# Patient Record
Sex: FEMALE | Race: BLACK OR AFRICAN AMERICAN | Employment: UNEMPLOYED | URBAN - METROPOLITAN AREA
[De-identification: names, ages, dates, MRNs, and addresses within clinical notes are randomized per-mention and may not be internally consistent; named-entity substitution may affect disease eponyms.]

---

## 2017-04-26 ENCOUNTER — TELEPHONE (OUTPATIENT)
Dept: GASTROENTEROLOGY | Age: 37
End: 2017-04-26

## 2017-05-01 ENCOUNTER — OFFICE VISIT (OUTPATIENT)
Dept: GASTROENTEROLOGY | Age: 37
End: 2017-05-01

## 2017-05-01 ENCOUNTER — TELEPHONE (OUTPATIENT)
Dept: GASTROENTEROLOGY | Age: 37
End: 2017-05-01

## 2017-05-01 VITALS
WEIGHT: 200 LBS | SYSTOLIC BLOOD PRESSURE: 114 MMHG | OXYGEN SATURATION: 98 % | DIASTOLIC BLOOD PRESSURE: 72 MMHG | HEIGHT: 62 IN | BODY MASS INDEX: 36.8 KG/M2 | HEART RATE: 94 BPM

## 2017-05-01 DIAGNOSIS — K62.89 ANAL PAIN: Primary | ICD-10-CM

## 2017-05-01 DIAGNOSIS — K59.01 SLOW TRANSIT CONSTIPATION: ICD-10-CM

## 2017-05-01 DIAGNOSIS — R10.32 LEFT LOWER QUADRANT PAIN: ICD-10-CM

## 2017-05-01 PROCEDURE — 99214 OFFICE O/P EST MOD 30 MIN: CPT | Performed by: NURSE PRACTITIONER

## 2017-05-01 RX ORDER — CLONAZEPAM 0.5 MG/1
TABLET ORAL
Refills: 2 | COMMUNITY
Start: 2017-02-22 | End: 2019-01-20

## 2017-05-01 RX ORDER — WHEAT DEXTRIN 3 G/3.8 G
4 POWDER (GRAM) ORAL
Qty: 1 CAN | Refills: 0 | Status: SHIPPED | OUTPATIENT
Start: 2017-05-01 | End: 2020-12-22

## 2017-05-01 ASSESSMENT — ENCOUNTER SYMPTOMS
BLURRED VISION: 0
ABDOMINAL PAIN: 1
BLOOD IN STOOL: 0
PHOTOPHOBIA: 0
ORTHOPNEA: 0
EYE PAIN: 0
NAUSEA: 0
SPUTUM PRODUCTION: 0
SHORTNESS OF BREATH: 0
WHEEZING: 1
HEARTBURN: 0
COUGH: 1
VOMITING: 0
BACK PAIN: 0
DOUBLE VISION: 0
DIARRHEA: 0
CONSTIPATION: 0
HEMOPTYSIS: 0

## 2017-05-02 RX ORDER — POLYETHYLENE GLYCOL 3350 17 G/17G
17 POWDER, FOR SOLUTION ORAL DAILY
Qty: 510 G | Refills: 1 | Status: SHIPPED | OUTPATIENT
Start: 2017-05-02 | End: 2017-06-01

## 2017-08-10 PROBLEM — R10.12 LEFT UPPER QUADRANT PAIN: Status: ACTIVE | Noted: 2017-08-10

## 2019-01-20 ENCOUNTER — APPOINTMENT (OUTPATIENT)
Dept: ULTRASOUND IMAGING | Age: 39
End: 2019-01-20
Payer: MEDICAID

## 2019-01-20 ENCOUNTER — HOSPITAL ENCOUNTER (EMERGENCY)
Age: 39
Discharge: HOME | End: 2019-01-20
Attending: EMERGENCY MEDICINE
Payer: MEDICAID

## 2019-01-20 ENCOUNTER — APPOINTMENT (OUTPATIENT)
Dept: CT IMAGING | Age: 39
End: 2019-01-20
Payer: MEDICAID

## 2019-01-20 VITALS
HEART RATE: 98 BPM | SYSTOLIC BLOOD PRESSURE: 135 MMHG | HEIGHT: 61 IN | BODY MASS INDEX: 37 KG/M2 | DIASTOLIC BLOOD PRESSURE: 78 MMHG | OXYGEN SATURATION: 97 % | RESPIRATION RATE: 18 BRPM | WEIGHT: 196 LBS | TEMPERATURE: 97.8 F

## 2019-01-20 DIAGNOSIS — R19.7 DIARRHEA, UNSPECIFIED TYPE: ICD-10-CM

## 2019-01-20 DIAGNOSIS — R11.2 NON-INTRACTABLE VOMITING WITH NAUSEA, UNSPECIFIED VOMITING TYPE: ICD-10-CM

## 2019-01-20 DIAGNOSIS — N80.129 ENDOMETRIOMA: ICD-10-CM

## 2019-01-20 DIAGNOSIS — R10.84 GENERALIZED ABDOMINAL PAIN: Primary | ICD-10-CM

## 2019-01-20 LAB
ALBUMIN SERPL-MCNC: 4.3 GM/DL (ref 3.4–5)
ALP BLD-CCNC: 91 IU/L (ref 40–129)
ALT SERPL-CCNC: 10 U/L (ref 10–40)
ANION GAP SERPL CALCULATED.3IONS-SCNC: 12 MMOL/L (ref 4–16)
AST SERPL-CCNC: 16 IU/L (ref 15–37)
BACTERIA: NEGATIVE /HPF
BASOPHILS ABSOLUTE: 0 K/CU MM
BASOPHILS RELATIVE PERCENT: 0.1 % (ref 0–1)
BILIRUB SERPL-MCNC: 0.5 MG/DL (ref 0–1)
BILIRUBIN URINE: NEGATIVE MG/DL
BLOOD, URINE: ABNORMAL
BUN BLDV-MCNC: 9 MG/DL (ref 6–23)
CALCIUM SERPL-MCNC: 9.2 MG/DL (ref 8.3–10.6)
CHLORIDE BLD-SCNC: 99 MMOL/L (ref 99–110)
CLARITY: CLEAR
CO2: 23 MMOL/L (ref 21–32)
COLOR: YELLOW
CREAT SERPL-MCNC: 0.8 MG/DL (ref 0.6–1.1)
DIFFERENTIAL TYPE: ABNORMAL
EOSINOPHILS ABSOLUTE: 0 K/CU MM
EOSINOPHILS RELATIVE PERCENT: 0 % (ref 0–3)
GFR AFRICAN AMERICAN: >60 ML/MIN/1.73M2
GFR NON-AFRICAN AMERICAN: >60 ML/MIN/1.73M2
GLUCOSE BLD-MCNC: 114 MG/DL (ref 70–99)
GLUCOSE, URINE: NEGATIVE MG/DL
HCG QUALITATIVE: NEGATIVE
HCT VFR BLD CALC: 42.7 % (ref 37–47)
HEMOGLOBIN: 13.7 GM/DL (ref 12.5–16)
IMMATURE NEUTROPHIL %: 0.4 % (ref 0–0.43)
KETONES, URINE: ABNORMAL MG/DL
LEUKOCYTE ESTERASE, URINE: NEGATIVE
LIPASE: 15 IU/L (ref 13–60)
LYMPHOCYTES ABSOLUTE: 1.1 K/CU MM
LYMPHOCYTES RELATIVE PERCENT: 12.7 % (ref 24–44)
MCH RBC QN AUTO: 29.3 PG (ref 27–31)
MCHC RBC AUTO-ENTMCNC: 32.1 % (ref 32–36)
MCV RBC AUTO: 91.2 FL (ref 78–100)
MONOCYTES ABSOLUTE: 0.3 K/CU MM
MONOCYTES RELATIVE PERCENT: 3.6 % (ref 0–4)
MUCUS: ABNORMAL HPF
NITRITE URINE, QUANTITATIVE: NEGATIVE
NUCLEATED RBC %: 0 %
PDW BLD-RTO: 13.5 % (ref 11.7–14.9)
PH, URINE: 5 (ref 5–8)
PLATELET # BLD: 338 K/CU MM (ref 140–440)
PMV BLD AUTO: 9 FL (ref 7.5–11.1)
POTASSIUM SERPL-SCNC: 4.3 MMOL/L (ref 3.5–5.1)
PROTEIN UA: NEGATIVE MG/DL
RBC # BLD: 4.68 M/CU MM (ref 4.2–5.4)
RBC URINE: 15 /HPF (ref 0–6)
SEGMENTED NEUTROPHILS ABSOLUTE COUNT: 7.1 K/CU MM
SEGMENTED NEUTROPHILS RELATIVE PERCENT: 83.2 % (ref 36–66)
SODIUM BLD-SCNC: 134 MMOL/L (ref 135–145)
SPECIFIC GRAVITY UA: 1.03 (ref 1–1.03)
SQUAMOUS EPITHELIAL: 2 /HPF
TOTAL IMMATURE NEUTOROPHIL: 0.03 K/CU MM
TOTAL NUCLEATED RBC: 0 K/CU MM
TOTAL PROTEIN: 7.5 GM/DL (ref 6.4–8.2)
TRICHOMONAS: ABNORMAL /HPF
UROBILINOGEN, URINE: NORMAL MG/DL (ref 0.2–1)
WBC # BLD: 8.6 K/CU MM (ref 4–10.5)
WBC UA: 1 /HPF (ref 0–5)

## 2019-01-20 PROCEDURE — 81001 URINALYSIS AUTO W/SCOPE: CPT

## 2019-01-20 PROCEDURE — 80053 COMPREHEN METABOLIC PANEL: CPT

## 2019-01-20 PROCEDURE — 74177 CT ABD & PELVIS W/CONTRAST: CPT

## 2019-01-20 PROCEDURE — 84703 CHORIONIC GONADOTROPIN ASSAY: CPT

## 2019-01-20 PROCEDURE — 6370000000 HC RX 637 (ALT 250 FOR IP): Performed by: EMERGENCY MEDICINE

## 2019-01-20 PROCEDURE — 96374 THER/PROPH/DIAG INJ IV PUSH: CPT

## 2019-01-20 PROCEDURE — 93975 VASCULAR STUDY: CPT

## 2019-01-20 PROCEDURE — 96361 HYDRATE IV INFUSION ADD-ON: CPT

## 2019-01-20 PROCEDURE — 6360000002 HC RX W HCPCS: Performed by: EMERGENCY MEDICINE

## 2019-01-20 PROCEDURE — 6360000004 HC RX CONTRAST MEDICATION: Performed by: EMERGENCY MEDICINE

## 2019-01-20 PROCEDURE — 96372 THER/PROPH/DIAG INJ SC/IM: CPT

## 2019-01-20 PROCEDURE — 99284 EMERGENCY DEPT VISIT MOD MDM: CPT

## 2019-01-20 PROCEDURE — 85025 COMPLETE CBC W/AUTO DIFF WBC: CPT

## 2019-01-20 PROCEDURE — 83690 ASSAY OF LIPASE: CPT

## 2019-01-20 PROCEDURE — 76830 TRANSVAGINAL US NON-OB: CPT

## 2019-01-20 PROCEDURE — 2580000003 HC RX 258: Performed by: EMERGENCY MEDICINE

## 2019-01-20 RX ORDER — PROMETHAZINE HYDROCHLORIDE 25 MG/1
25 TABLET ORAL EVERY 6 HOURS PRN
Qty: 15 TABLET | Refills: 0 | Status: SHIPPED | OUTPATIENT
Start: 2019-01-20 | End: 2019-01-27

## 2019-01-20 RX ORDER — DICYCLOMINE HYDROCHLORIDE 10 MG/1
10 CAPSULE ORAL 3 TIMES DAILY
Qty: 15 CAPSULE | Refills: 3 | Status: SHIPPED | OUTPATIENT
Start: 2019-01-20 | End: 2021-01-29

## 2019-01-20 RX ORDER — MORPHINE SULFATE 4 MG/ML
4 INJECTION, SOLUTION INTRAMUSCULAR; INTRAVENOUS ONCE
Status: COMPLETED | OUTPATIENT
Start: 2019-01-20 | End: 2019-01-20

## 2019-01-20 RX ORDER — 0.9 % SODIUM CHLORIDE 0.9 %
1000 INTRAVENOUS SOLUTION INTRAVENOUS ONCE
Status: COMPLETED | OUTPATIENT
Start: 2019-01-20 | End: 2019-01-20

## 2019-01-20 RX ORDER — HYDROCODONE BITARTRATE AND ACETAMINOPHEN 5; 325 MG/1; MG/1
2 TABLET ORAL ONCE
Status: COMPLETED | OUTPATIENT
Start: 2019-01-20 | End: 2019-01-20

## 2019-01-20 RX ORDER — PROMETHAZINE HYDROCHLORIDE 25 MG/ML
25 INJECTION, SOLUTION INTRAMUSCULAR; INTRAVENOUS ONCE
Status: COMPLETED | OUTPATIENT
Start: 2019-01-20 | End: 2019-01-20

## 2019-01-20 RX ORDER — DICYCLOMINE HYDROCHLORIDE 10 MG/ML
20 INJECTION INTRAMUSCULAR ONCE
Status: COMPLETED | OUTPATIENT
Start: 2019-01-20 | End: 2019-01-20

## 2019-01-20 RX ORDER — ALPRAZOLAM 1 MG/1
1 TABLET ORAL 2 TIMES DAILY
COMMUNITY
End: 2021-01-29

## 2019-01-20 RX ORDER — HYDROCODONE BITARTRATE AND ACETAMINOPHEN 5; 325 MG/1; MG/1
1 TABLET ORAL EVERY 6 HOURS PRN
Qty: 10 TABLET | Refills: 0 | Status: SHIPPED | OUTPATIENT
Start: 2019-01-20 | End: 2019-01-23

## 2019-01-20 RX ADMIN — DICYCLOMINE HYDROCHLORIDE 20 MG: 20 INJECTION, SOLUTION INTRAMUSCULAR at 19:07

## 2019-01-20 RX ADMIN — PROMETHAZINE HYDROCHLORIDE 25 MG: 25 INJECTION INTRAMUSCULAR; INTRAVENOUS at 19:07

## 2019-01-20 RX ADMIN — IOPAMIDOL 75 ML: 755 INJECTION, SOLUTION INTRAVENOUS at 20:23

## 2019-01-20 RX ADMIN — HYDROCODONE BITARTRATE AND ACETAMINOPHEN 2 TABLET: 5; 325 TABLET ORAL at 22:43

## 2019-01-20 RX ADMIN — SODIUM CHLORIDE 1000 ML: 9 INJECTION, SOLUTION INTRAVENOUS at 19:07

## 2019-01-20 RX ADMIN — MORPHINE SULFATE 4 MG: 4 INJECTION INTRAVENOUS at 21:04

## 2019-01-20 ASSESSMENT — PAIN DESCRIPTION - DESCRIPTORS
DESCRIPTORS: PENETRATING
DESCRIPTORS: PENETRATING

## 2019-01-20 ASSESSMENT — PAIN DESCRIPTION - FREQUENCY
FREQUENCY: INTERMITTENT
FREQUENCY: INTERMITTENT

## 2019-01-20 ASSESSMENT — PAIN DESCRIPTION - PAIN TYPE
TYPE: CHRONIC PAIN;ACUTE PAIN
TYPE: CHRONIC PAIN

## 2019-01-20 ASSESSMENT — PAIN DESCRIPTION - ONSET
ONSET: ON-GOING
ONSET: ON-GOING

## 2019-01-20 ASSESSMENT — PAIN DESCRIPTION - LOCATION
LOCATION: ABDOMEN
LOCATION: ABDOMEN

## 2019-01-20 ASSESSMENT — PAIN DESCRIPTION - PROGRESSION: CLINICAL_PROGRESSION: NOT CHANGED

## 2019-01-20 ASSESSMENT — PAIN SCALES - GENERAL
PAINLEVEL_OUTOF10: 10

## 2019-05-23 ENCOUNTER — HOSPITAL ENCOUNTER (OUTPATIENT)
Dept: GENERAL RADIOLOGY | Age: 39
Discharge: HOME | End: 2019-05-23
Payer: MEDICAID

## 2019-05-23 DIAGNOSIS — R10.10 INTERMITTENT UPPER ABDOMINAL PAIN: ICD-10-CM

## 2019-05-23 PROCEDURE — 74245 FL UGI W SMALL BOWEL: CPT

## 2019-12-15 ENCOUNTER — APPOINTMENT (OUTPATIENT)
Dept: GENERAL RADIOLOGY | Age: 39
End: 2019-12-15
Payer: MEDICAID

## 2019-12-15 ENCOUNTER — HOSPITAL ENCOUNTER (EMERGENCY)
Age: 39
Discharge: HOME | End: 2019-12-15
Payer: MEDICAID

## 2019-12-15 ENCOUNTER — APPOINTMENT (OUTPATIENT)
Dept: CT IMAGING | Age: 39
End: 2019-12-15
Payer: MEDICAID

## 2019-12-15 VITALS
HEART RATE: 81 BPM | SYSTOLIC BLOOD PRESSURE: 102 MMHG | WEIGHT: 215 LBS | OXYGEN SATURATION: 97 % | RESPIRATION RATE: 14 BRPM | DIASTOLIC BLOOD PRESSURE: 69 MMHG | TEMPERATURE: 98.6 F | BODY MASS INDEX: 39.56 KG/M2 | HEIGHT: 62 IN

## 2019-12-15 DIAGNOSIS — R52 BODY ACHES: Primary | ICD-10-CM

## 2019-12-15 DIAGNOSIS — R93.5 ABNORMAL ABDOMINAL CT SCAN: ICD-10-CM

## 2019-12-15 DIAGNOSIS — R51.9 NONINTRACTABLE EPISODIC HEADACHE, UNSPECIFIED HEADACHE TYPE: ICD-10-CM

## 2019-12-15 DIAGNOSIS — R10.84 GENERALIZED ABDOMINAL PAIN: ICD-10-CM

## 2019-12-15 LAB
ALBUMIN SERPL-MCNC: 4.2 GM/DL (ref 3.4–5)
ALP BLD-CCNC: 93 IU/L (ref 40–128)
ALT SERPL-CCNC: 14 U/L (ref 10–40)
ANION GAP SERPL CALCULATED.3IONS-SCNC: 9 MMOL/L (ref 4–16)
AST SERPL-CCNC: 18 IU/L (ref 15–37)
BASOPHILS ABSOLUTE: 0 K/CU MM
BASOPHILS RELATIVE PERCENT: 0.4 % (ref 0–1)
BILIRUB SERPL-MCNC: 0.3 MG/DL (ref 0–1)
BUN BLDV-MCNC: 5 MG/DL (ref 6–23)
CALCIUM SERPL-MCNC: 9.2 MG/DL (ref 8.3–10.6)
CHLORIDE BLD-SCNC: 99 MMOL/L (ref 99–110)
CO2: 27 MMOL/L (ref 21–32)
CREAT SERPL-MCNC: 0.7 MG/DL (ref 0.6–1.1)
DIFFERENTIAL TYPE: ABNORMAL
EOSINOPHILS ABSOLUTE: 0.2 K/CU MM
EOSINOPHILS RELATIVE PERCENT: 2.5 % (ref 0–3)
GFR AFRICAN AMERICAN: >60 ML/MIN/1.73M2
GFR NON-AFRICAN AMERICAN: >60 ML/MIN/1.73M2
GLUCOSE BLD-MCNC: 120 MG/DL (ref 70–99)
HCT VFR BLD CALC: 39.2 % (ref 37–47)
HEMOGLOBIN: 12.5 GM/DL (ref 12.5–16)
IMMATURE NEUTROPHIL %: 0.4 % (ref 0–0.43)
LIPASE: 22 IU/L (ref 13–60)
LYMPHOCYTES ABSOLUTE: 2.2 K/CU MM
LYMPHOCYTES RELATIVE PERCENT: 28.6 % (ref 24–44)
MCH RBC QN AUTO: 29.2 PG (ref 27–31)
MCHC RBC AUTO-ENTMCNC: 31.9 % (ref 32–36)
MCV RBC AUTO: 91.6 FL (ref 78–100)
MONOCYTES ABSOLUTE: 0.7 K/CU MM
MONOCYTES RELATIVE PERCENT: 9.3 % (ref 0–4)
NUCLEATED RBC %: 0 %
PDW BLD-RTO: 13.8 % (ref 11.7–14.9)
PLATELET # BLD: 332 K/CU MM (ref 140–440)
PMV BLD AUTO: 9 FL (ref 7.5–11.1)
POTASSIUM SERPL-SCNC: 4.3 MMOL/L (ref 3.5–5.1)
RAPID INFLUENZA  B AGN: NEGATIVE
RAPID INFLUENZA A AGN: NEGATIVE
RBC # BLD: 4.28 M/CU MM (ref 4.2–5.4)
SEGMENTED NEUTROPHILS ABSOLUTE COUNT: 4.4 K/CU MM
SEGMENTED NEUTROPHILS RELATIVE PERCENT: 58.8 % (ref 36–66)
SODIUM BLD-SCNC: 135 MMOL/L (ref 135–145)
TOTAL IMMATURE NEUTOROPHIL: 0.03 K/CU MM
TOTAL NUCLEATED RBC: 0 K/CU MM
TOTAL PROTEIN: 7.6 GM/DL (ref 6.4–8.2)
TOTAL RETICULOCYTE COUNT: 0.06 K/CU MM
WBC # BLD: 7.5 K/CU MM (ref 4–10.5)

## 2019-12-15 PROCEDURE — 96374 THER/PROPH/DIAG INJ IV PUSH: CPT

## 2019-12-15 PROCEDURE — 2580000003 HC RX 258: Performed by: PHYSICIAN ASSISTANT

## 2019-12-15 PROCEDURE — 71046 X-RAY EXAM CHEST 2 VIEWS: CPT

## 2019-12-15 PROCEDURE — 99284 EMERGENCY DEPT VISIT MOD MDM: CPT

## 2019-12-15 PROCEDURE — 6360000002 HC RX W HCPCS: Performed by: PHYSICIAN ASSISTANT

## 2019-12-15 PROCEDURE — 87804 INFLUENZA ASSAY W/OPTIC: CPT

## 2019-12-15 PROCEDURE — 74177 CT ABD & PELVIS W/CONTRAST: CPT

## 2019-12-15 PROCEDURE — 85025 COMPLETE CBC W/AUTO DIFF WBC: CPT

## 2019-12-15 PROCEDURE — 6370000000 HC RX 637 (ALT 250 FOR IP): Performed by: PHYSICIAN ASSISTANT

## 2019-12-15 PROCEDURE — 6360000004 HC RX CONTRAST MEDICATION: Performed by: PHYSICIAN ASSISTANT

## 2019-12-15 PROCEDURE — 80053 COMPREHEN METABOLIC PANEL: CPT

## 2019-12-15 PROCEDURE — 83690 ASSAY OF LIPASE: CPT

## 2019-12-15 RX ORDER — IBUPROFEN 600 MG/1
600 TABLET ORAL EVERY 6 HOURS PRN
Qty: 30 TABLET | Refills: 0 | Status: SHIPPED | OUTPATIENT
Start: 2019-12-15 | End: 2021-01-29

## 2019-12-15 RX ORDER — DIPHENHYDRAMINE HCL 25 MG
50 TABLET ORAL ONCE
Status: COMPLETED | OUTPATIENT
Start: 2019-12-15 | End: 2019-12-15

## 2019-12-15 RX ORDER — GUAIFENESIN AND DEXTROMETHORPHAN HYDROBROMIDE 100; 10 MG/5ML; MG/5ML
5 SOLUTION ORAL EVERY 4 HOURS PRN
Qty: 90 ML | Refills: 0 | Status: SHIPPED | OUTPATIENT
Start: 2019-12-15 | End: 2021-01-29

## 2019-12-15 RX ORDER — GUAIFENESIN/DEXTROMETHORPHAN 100-10MG/5
5 SYRUP ORAL ONCE
Status: DISCONTINUED | OUTPATIENT
Start: 2019-12-15 | End: 2019-12-15 | Stop reason: HOSPADM

## 2019-12-15 RX ORDER — 0.9 % SODIUM CHLORIDE 0.9 %
1000 INTRAVENOUS SOLUTION INTRAVENOUS ONCE
Status: COMPLETED | OUTPATIENT
Start: 2019-12-15 | End: 2019-12-15

## 2019-12-15 RX ORDER — METOCLOPRAMIDE HYDROCHLORIDE 5 MG/ML
10 INJECTION INTRAMUSCULAR; INTRAVENOUS ONCE
Status: COMPLETED | OUTPATIENT
Start: 2019-12-15 | End: 2019-12-15

## 2019-12-15 RX ADMIN — METOCLOPRAMIDE 10 MG: 5 INJECTION, SOLUTION INTRAMUSCULAR; INTRAVENOUS at 08:01

## 2019-12-15 RX ADMIN — DIPHENHYDRAMINE HYDROCHLORIDE 50 MG: 25 TABLET ORAL at 08:00

## 2019-12-15 RX ADMIN — SODIUM CHLORIDE 1000 ML: 9 INJECTION, SOLUTION INTRAVENOUS at 08:00

## 2019-12-15 RX ADMIN — IOPAMIDOL 75 ML: 755 INJECTION, SOLUTION INTRAVENOUS at 10:11

## 2019-12-15 ASSESSMENT — PAIN DESCRIPTION - PAIN TYPE: TYPE: ACUTE PAIN

## 2019-12-15 ASSESSMENT — PAIN SCALES - GENERAL: PAINLEVEL_OUTOF10: 10

## 2019-12-15 ASSESSMENT — PAIN DESCRIPTION - FREQUENCY: FREQUENCY: CONTINUOUS

## 2019-12-15 ASSESSMENT — PAIN DESCRIPTION - ORIENTATION: ORIENTATION: RIGHT;LEFT;POSTERIOR

## 2019-12-15 ASSESSMENT — PAIN DESCRIPTION - DESCRIPTORS: DESCRIPTORS: HEADACHE

## 2019-12-15 ASSESSMENT — PAIN DESCRIPTION - PROGRESSION: CLINICAL_PROGRESSION: GRADUALLY WORSENING

## 2019-12-15 ASSESSMENT — PAIN DESCRIPTION - ONSET: ONSET: PROGRESSIVE

## 2019-12-15 ASSESSMENT — PAIN DESCRIPTION - LOCATION: LOCATION: HEAD

## 2020-06-25 ENCOUNTER — HOSPITAL ENCOUNTER (EMERGENCY)
Age: 40
Discharge: HOME | End: 2020-06-25
Payer: MEDICAID

## 2020-06-25 VITALS
SYSTOLIC BLOOD PRESSURE: 129 MMHG | BODY MASS INDEX: 43.43 KG/M2 | DIASTOLIC BLOOD PRESSURE: 77 MMHG | WEIGHT: 230 LBS | RESPIRATION RATE: 16 BRPM | HEART RATE: 109 BPM | OXYGEN SATURATION: 96 % | TEMPERATURE: 97.7 F | HEIGHT: 61 IN

## 2020-06-25 PROCEDURE — 99282 EMERGENCY DEPT VISIT SF MDM: CPT

## 2020-06-25 PROCEDURE — 96372 THER/PROPH/DIAG INJ SC/IM: CPT

## 2020-06-25 PROCEDURE — 6360000002 HC RX W HCPCS: Performed by: PHYSICIAN ASSISTANT

## 2020-06-25 RX ORDER — DIPHENHYDRAMINE HYDROCHLORIDE 50 MG/ML
50 INJECTION INTRAMUSCULAR; INTRAVENOUS ONCE
Status: COMPLETED | OUTPATIENT
Start: 2020-06-25 | End: 2020-06-25

## 2020-06-25 RX ORDER — METHYLPREDNISOLONE 4 MG/1
TABLET ORAL
Qty: 1 KIT | Refills: 0 | Status: SHIPPED | OUTPATIENT
Start: 2020-06-25 | End: 2020-07-01

## 2020-06-25 RX ORDER — DEXAMETHASONE SODIUM PHOSPHATE 10 MG/ML
10 INJECTION, SOLUTION INTRAMUSCULAR; INTRAVENOUS ONCE
Status: COMPLETED | OUTPATIENT
Start: 2020-06-25 | End: 2020-06-25

## 2020-06-25 RX ORDER — DIPHENHYDRAMINE HCL 25 MG
25 CAPSULE ORAL EVERY 6 HOURS PRN
Qty: 20 CAPSULE | Refills: 0 | Status: SHIPPED | OUTPATIENT
Start: 2020-06-25 | End: 2020-07-05

## 2020-06-25 RX ADMIN — DEXAMETHASONE SODIUM PHOSPHATE 10 MG: 10 INJECTION, SOLUTION INTRAMUSCULAR; INTRAVENOUS at 02:31

## 2020-06-25 RX ADMIN — DIPHENHYDRAMINE HYDROCHLORIDE 50 MG: 50 INJECTION INTRAMUSCULAR; INTRAVENOUS at 02:31

## 2020-09-02 ENCOUNTER — OFFICE VISIT (OUTPATIENT)
Dept: BARIATRICS/WEIGHT MGMT | Age: 40
End: 2020-09-02
Payer: MEDICAID

## 2020-09-02 VITALS
SYSTOLIC BLOOD PRESSURE: 118 MMHG | WEIGHT: 219.3 LBS | HEIGHT: 61 IN | HEART RATE: 90 BPM | TEMPERATURE: 98.2 F | DIASTOLIC BLOOD PRESSURE: 76 MMHG | OXYGEN SATURATION: 96 % | RESPIRATION RATE: 14 BRPM | BODY MASS INDEX: 41.4 KG/M2

## 2020-09-02 PROCEDURE — G8427 DOCREV CUR MEDS BY ELIG CLIN: HCPCS | Performed by: NURSE PRACTITIONER

## 2020-09-02 PROCEDURE — G8417 CALC BMI ABV UP PARAM F/U: HCPCS | Performed by: NURSE PRACTITIONER

## 2020-09-02 PROCEDURE — 1036F TOBACCO NON-USER: CPT | Performed by: NURSE PRACTITIONER

## 2020-09-02 PROCEDURE — 99204 OFFICE O/P NEW MOD 45 MIN: CPT | Performed by: NURSE PRACTITIONER

## 2020-09-02 RX ORDER — TRAMADOL HYDROCHLORIDE 50 MG/1
50 TABLET ORAL EVERY 6 HOURS PRN
COMMUNITY
End: 2021-01-29

## 2020-09-02 RX ORDER — PROMETHAZINE HYDROCHLORIDE 25 MG/1
25 TABLET ORAL EVERY 6 HOURS PRN
COMMUNITY
End: 2021-01-29

## 2020-09-02 NOTE — PROGRESS NOTES
 COLONOSCOPY  2016    internal hemorrhoids    ENDOSCOPY, COLON, DIAGNOSTIC  2015    HERNIA REPAIR  13    Hernia Repair x's 2    HYSTERECTOMY      VENTRAL HERNIA REPAIR  2016    laprascopic ventral hernia repair with mesh        Family History:  Family History   Problem Relation Age of Onset    Heart Disease Maternal Grandfather        Social History:  Social History     Socioeconomic History    Marital status: Single     Spouse name: Not on file    Number of children: Not on file    Years of education: Not on file    Highest education level: Not on file   Occupational History    Not on file   Social Needs    Financial resource strain: Not on file    Food insecurity     Worry: Not on file     Inability: Not on file    Transportation needs     Medical: Not on file     Non-medical: Not on file   Tobacco Use    Smoking status: Former Smoker     Packs/day: 0.50     Years: 12.00     Pack years: 6.00     Types: Cigarettes     Last attempt to quit: 2012     Years since quittin.6    Smokeless tobacco: Never Used   Substance and Sexual Activity    Alcohol use: No    Drug use: Yes     Frequency: 7.0 times per week     Types: Marijuana    Sexual activity: Never     Birth control/protection: Surgical   Lifestyle    Physical activity     Days per week: Not on file     Minutes per session: Not on file    Stress: Not on file   Relationships    Social connections     Talks on phone: Not on file     Gets together: Not on file     Attends Druze service: Not on file     Active member of club or organization: Not on file     Attends meetings of clubs or organizations: Not on file     Relationship status: Not on file    Intimate partner violence     Fear of current or ex partner: Not on file     Emotionally abused: Not on file     Physically abused: Not on file     Forced sexual activity: Not on file   Other Topics Concern    Not on file   Social History Narrative    Not on file Review of Systems - History obtained from the patient. Review of Systems - General ROS: negative for - chills, fever, hot flashes or night sweats  ENT ROS: negative for - headaches, oral lesions, sore throat, vertigo or visual changes  Allergy and Immunology ROS: negative for - hives or nasal congestion  Endocrine ROS: negative for - hair pattern changes, mood swings or polydipsia/polyuria  Respiratory ROS: no cough, shortness of breath, or wheezing  Cardiovascular ROS: no chest pain or dyspnea on exertion  Gastrointestinal ROS: no abdominal pain, change in bowel habits, or black or bloody stools  Genito-Urinary ROS: no dysuria, incontinence, trouble voiding, or hematuria  Musculoskeletal ROS: Negative for joint pain or myalgia  Neurological ROS: negative for - behavioral changes, dizziness, headaches, impaired coordination/balance, numbness/tingling or seizures  Dermatological ROS: negative for - eczema or nail changes  Psychological ROS: +anxiety- no medications, no depression or suicidal ideation     Objective     Physical Exam   Constitutional: Oriented to person, place, and time and well-developed, well-nourished, and in no distress. No distress. Obese   HENT: normal  Head: Normocephalic and atraumatic. Eyes: Conjunctivae are normal. Pupils are equal, round, and reactive to light. Neck: Normal range of motion. Neck supple. Cardiovascular: Normal rate, regular rhythm, normal heart sounds and intact distal pulses. No murmur heard. Pulmonary/Chest: Effort normal and breath sounds normal. No respiratory distress. Abdominal: Soft. Bowel sounds are normal. Exhibits no distension. There is no tenderness. Large. Musculoskeletal: Exhibits no edema or tenderness. Lymphadenopathy: Deferred. Neurological: She is alert and oriented to person, place, and time. No cranial nerve deficit. Skin: Skin is warm. She is not diaphoretic.    Psychiatric: Mood, memory, affect and judgment normal. Assessment  and Plan    Plan    1. Morbid obesity with BMI of 40.0-44.9, adult ([facility] [address]  - Discussed weight loss program with patient and the metabolic components of obesity and insulin resistance over time. - Ultimately will need to change overall eating behaviors to have success in continued management with weight loss. - All questions answered and overall appears very receptive.   - Financially limited on weight loss medications after further discussion. Will plan for surgical program and discussed with patient today. Educated on below diet and will have official appt. With RD. Patient was encouraged to journal all food intake using Dot[employer] pal. Keep calorie level at approximately [id number]. Protein intake is to be a minimum of 60 grams per day. Water drinking was encouraged with a goal of 64oz-128oz daily. Beverages to be calorie free except for milk. Every other beverage should be water. They are to avoid soda. Also discussed incorporating protein meal replacement shake. I spent 45 minutes with patient and more than 50% of the office visit today was spent in face to face counseling regarding diet and exercise, counting calories, complying with the diet recommendations. Patient counseled on the benefits of treatment plan at length while in the office today. Patient states an understanding and willingness to proceed with the recommended weight loss plan. No orders of the defined types were placed in this encounter. No orders of the defined types were placed in this encounter. Follow Up:  No follow-ups on file.     Fariba Wilson, CNP

## 2020-10-15 ENCOUNTER — OFFICE VISIT (OUTPATIENT)
Dept: BARIATRICS/WEIGHT MGMT | Age: 40
End: 2020-10-15
Payer: MEDICAID

## 2020-10-15 VITALS
HEART RATE: 84 BPM | WEIGHT: 220.3 LBS | DIASTOLIC BLOOD PRESSURE: 70 MMHG | TEMPERATURE: 97.5 F | SYSTOLIC BLOOD PRESSURE: 110 MMHG | BODY MASS INDEX: 40.54 KG/M2 | HEIGHT: 62 IN

## 2020-10-15 PROCEDURE — 99204 OFFICE O/P NEW MOD 45 MIN: CPT | Performed by: SURGERY

## 2020-10-15 PROCEDURE — 1036F TOBACCO NON-USER: CPT | Performed by: SURGERY

## 2020-10-15 PROCEDURE — G8428 CUR MEDS NOT DOCUMENT: HCPCS | Performed by: SURGERY

## 2020-10-15 PROCEDURE — G8484 FLU IMMUNIZE NO ADMIN: HCPCS | Performed by: SURGERY

## 2020-10-15 PROCEDURE — G8417 CALC BMI ABV UP PARAM F/U: HCPCS | Performed by: SURGERY

## 2020-10-15 SDOH — HEALTH STABILITY: MENTAL HEALTH: HOW OFTEN DO YOU HAVE A DRINK CONTAINING ALCOHOL?: MONTHLY OR LESS

## 2020-10-15 ASSESSMENT — ENCOUNTER SYMPTOMS
RESPIRATORY NEGATIVE: 1
GASTROINTESTINAL NEGATIVE: 1
ALLERGIC/IMMUNOLOGIC NEGATIVE: 1
EYES NEGATIVE: 1
BACK PAIN: 1

## 2020-10-15 NOTE — PROGRESS NOTES
Initial Bariatric Surgery Consultation History and Physical    Chief Complaint:  Class III Obesity (BMI greater than or equal to 40.0)    History of Present Illness: The patient is a 45 y.o. female being seen today for initial bariatric surgery consultation. The patient previously attended a bariatric surgery informational seminar on: no in person bc COVID, presentation emailed. The patient's PCP is Ursula Stewart. The patient first recognized having issues with increased weight ~4-5 years ago. The patient identifies the following precipitants causing, or contributing to, weight gain: \"I don't even know. \"    The patient estimates the lowest weight in the past five years is approximately ~200 pounds. The patient estimates the highest weight in the past five years is approximately 220 pounds. The patient's current weight is 220 pounds, while the current BMI is Body mass index is 40.29 kg/m². The patient has not had previous bariatric surgery. The patient has tried the following diet(s): staying away from fried foods, red meats, red dye, spicy foods, pasta/breads, sweets. The patient has tried the following over-the-counter drugs and/or prescription weight loss medications: Adipex, OTC medications    The patient has tried the following physical activities/exercies: walking on trail about 1 hour about 3 x / week. The patient was mostly unsuccessful with previous dietary, medication, and/or physical activity for sustained weight loss. In reviewing the patient's typical daily diet, it consists of the following:   Breakfast: banana and OJ   Snack: denies   Lunch: skipped    Snack: denies   Dinner: 10 pc baked naked chicken with no sauce   Snack: denies    The patient reports drinking the following beverages: fruit water    The patient state eating outside of the house approximately 2 times per week.  Of these, approximately 0 are at fast food establishment, while 2 are at a sit down restaurant.     The patient has the following cardiovascular risk factor(s): cigarette marijuana     The patient has the following obesity-related disorder(s): symptomatic osteoarthritis (b/l feet), cholelithiasis (s/p lap tom)    Past Medical History:  Past Medical History:   Diagnosis Date    Abdominal cramps     \"feel like I am constantly getting punched  in my stomach- for 20 yrs\"    ADHD (attention deficit hyperactivity disorder)     Anxiety     Asthma     last flare up 2016    Depression     HX OTHER MEDICAL 2016    \"irreg periods for 20 yrs- had period 2016 and 2016 and none since then\"    Insomnia     Nausea        Past Surgical History:  Past Surgical History:   Procedure Laterality Date    CHOLECYSTECTOMY, LAPAROSCOPIC  14    robotic assisted lap tom    COLONOSCOPY  2016    internal hemorrhoids    ENDOSCOPY, COLON, DIAGNOSTIC      HERNIA REPAIR  13    Hernia Repair x's 2    HYSTERECTOMY      VENTRAL HERNIA REPAIR  2016    laprascopic ventral hernia repair with mesh        Family History:  Family History   Problem Relation Age of Onset    Heart Disease Maternal Grandfather        Social History:  Social History     Socioeconomic History    Marital status: Single     Spouse name: Not on file    Number of children: Not on file    Years of education: Not on file    Highest education level: Not on file   Occupational History    Not on file   Social Needs    Financial resource strain: Not on file    Food insecurity     Worry: Not on file     Inability: Not on file    Transportation needs     Medical: Not on file     Non-medical: Not on file   Tobacco Use    Smoking status: Former Smoker     Packs/day: 0.50     Years: 12.00     Pack years: 6.00     Types: Cigarettes     Last attempt to quit: 2012     Years since quittin.7    Smokeless tobacco: Never Used   Substance and Sexual Activity    Alcohol use: Yes     Frequency: Monthly or less Comment: occasionally    Drug use: Yes     Frequency: 7.0 times per week     Types: Marijuana    Sexual activity: Yes     Partners: Female     Birth control/protection: Surgical   Lifestyle    Physical activity     Days per week: Not on file     Minutes per session: Not on file    Stress: Not on file   Relationships    Social connections     Talks on phone: Not on file     Gets together: Not on file     Attends Mu-ism service: Not on file     Active member of club or organization: Not on file     Attends meetings of clubs or organizations: Not on file     Relationship status: Not on file    Intimate partner violence     Fear of current or ex partner: Not on file     Emotionally abused: Not on file     Physically abused: Not on file     Forced sexual activity: Not on file   Other Topics Concern    Not on file   Social History Narrative    Not on file       Allergies: Allergies   Allergen Reactions    Zofran [Ondansetron Hcl] Hives    Zoloft [Sertraline Hcl]        Medications:  Current Outpatient Medications   Medication Sig Dispense Refill    traMADol (ULTRAM) 50 MG tablet Take 50 mg by mouth every 6 hours as needed for Pain.  promethazine (PHENERGAN) 25 MG tablet Take 25 mg by mouth every 6 hours as needed for Nausea      ibuprofen (ADVIL;MOTRIN) 600 MG tablet Take 1 tablet by mouth every 6 hours as needed for Pain 30 tablet 0    Dextromethorphan-guaiFENesin (Q-TUSSIN DM)  MG/5ML SYRP Take 5 mLs by mouth every 4 hours as needed for Cough 90 mL 0    ALPRAZolam (XANAX) 1 MG tablet Take 1 mg by mouth 2 times daily. [place] dicyclomine (BENTYL) 10 MG capsule Take 1 capsule by mouth 3 times daily 15 capsule 3    Wheat Dextrin (BENEFIBER) POWD Take 4 g by mouth 3 times daily (with meals) 1 Can 0    zolpidem (AMBIEN) 10 MG tablet       traZODone (DESYREL) 100 MG tablet Take 100 mg by mouth nightly      buPROPion (WELLBUTRIN XL) 150 MG XL tablet 450 mg every morning \"take 150 mg and 300 mg tab together      albuterol (PROVENTIL HFA) 108 (90 BASE) MCG/ACT inhaler Inhale 2 puffs into the lungs every 6 hours as needed for Wheezing. 1 Inhaler 3    Spacer/Aero-Holding Chambers (AEROCHAMBER) MISC Inhale 1 Device into the lungs every 6 hours. 1 each 0     No current facility-administered medications for this visit. Review of Systems:  Review of Systems   Constitutional: Positive for fatigue. HENT: Negative. Eyes: Negative. Respiratory: Negative. Cardiovascular: Negative. Gastrointestinal: Negative. Endocrine: Negative. Genitourinary: Negative. Musculoskeletal: Positive for back pain and gait problem. Skin: Negative. Allergic/Immunologic: Negative. Negative for environmental allergies and food allergies. Hematological: Negative. Psychiatric/Behavioral: Negative. Physical Exam:  Physical Exam  Vitals signs reviewed. Constitutional:       General: She is not in acute distress. Appearance: She is obese. She is not ill-appearing, toxic-appearing or diaphoretic. HENT:      Head: Normocephalic and atraumatic. Right Ear: Tympanic membrane normal.      Left Ear: Tympanic membrane normal.      Nose: Nose normal.   Eyes:      General:         Right eye: No discharge. Left eye: No discharge. Extraocular Movements: Extraocular movements intact. Neck:      Musculoskeletal: Normal range of motion. Cardiovascular:      Rate and Rhythm: Normal rate. Pulses: Normal pulses. Pulmonary:      Effort: Pulmonary effort is normal. No respiratory distress. Abdominal:      Palpations: Abdomen is soft. Tenderness: There is no abdominal tenderness. There is no guarding or rebound. Hernia: No hernia is present. Comments: Previous lap tom, midline ventral hernia incisions are C/D/I and well-healed    Musculoskeletal:         General: No swelling. Skin:     General: Skin is warm.       Capillary Refill: Capillary refill takes less than 2 seconds. Neurological:      General: No focal deficit present. Mental Status: She is alert. Psychiatric:         Mood and Affect: Mood normal.          Assessment and Plan:  Hadley Morris is a 45 y.o. presenting to clinic for initial surgical evaluation for bariatric surgery. Patient Active Problem List   Diagnosis    Umbilical hernia with obstruction but no gangrene    Periumbilical abdominal pain    Rectal pain    Left upper quadrant pain       Plan   1. Because of the known health risks associated with excess body weight, the patient is a good candidate for bariatric surgery. Reviewed with the patient both sleeve gastrectomy and RYGB, including an overview of each--with pros and cons--while showing them a picture diagram of the new anatomical surgical changes. The patient is interested in sleeve gastrectomy. Discussed with the patient the basics of the surgical procedure including risks, benefits, alternatives, and expected hospital course. Expectations were discussed with the patient and the patient agreed to proceed. The patient previously attended an informational seminar which discussed surgical and non-surgical options as well as procedure specific risks, benefits, and alternatives. 2. Will order initial bariatric surgery labs (routine blood work, chemistries, and nutritional labs). 3. The patient will be scheduled to be seen by our weight management SYED and dietician after having the above labs drawn and resulted. 4. The patient will be referred for an evaluation by physical therapy for assistance with an exercise plan. 5. We will continue the work-up process toward surgery. 6. The patient will be given a bariatric handbook by the dietician at the initial nutrition class. The patient will be instructed to review the handbook and to ask questions about any part which is not clear at any time throughout the workup process.  It was stressed to the patient the need to thoroughly review the handbook and the patient agreed. 7. This patient is a female of reproductive age and was advised she should not become pregnant during the bariatric workup process and for at least 12-18 months after surgery. The patient is agreeable to this plan bc she has had a hysterectomy. 8. Discussed with patient that losing weight prior to surgery is statistically significant indicator of post-operative success. Goal set at this visit for patient for weight loss prior to surgery is 10-15 lbs. Thank you for this referral / consult. Please call with any questions or concerns you have. Patient was seen with total face-to-face time of 45 minutes. More than 50% of this visit was counseling on diet, nutrition, surgical options, and education as above documented in my note.     Electronically signed by Bradford Bassett II, MD on 10/15/2020 at 2:37 PM

## 2020-11-02 ENCOUNTER — TELEMEDICINE (OUTPATIENT)
Dept: BARIATRICS/WEIGHT MGMT | Age: 40
End: 2020-11-02

## 2020-11-02 PROCEDURE — 99999 PR OFFICE/OUTPT VISIT,PROCEDURE ONLY: CPT

## 2020-11-02 NOTE — PROGRESS NOTES
Increased adiposity compared to reference standard or established norms   Etiology: Excess intake compared to output over time   S/S: Ht: 62\" Wt: 220.3 lbs BMI: 40.29    NUTRITION INTERVENTIONS:    Individualized treatment goals to address nutritiondiagnosis:   Instructed on 1200 kcal diet for weight loss   Provided pt handbook, food lists, and goal card   Encouraged Physical activity as approved by physician    MONITORING/ EVALUATION/ PLAN:   Pt verbalized understanding of allmaterials covered   Pt asked pertinent questions throughout the session - expect compliance with nutrition guidelines presented   Provided pt with contact information should questions arise prior to next visit   Will f/u with pt in 2-3 months for further education   Mickey Kennedy MS, RDN, LD  11/2/2020

## 2020-11-18 ENCOUNTER — OFFICE VISIT (OUTPATIENT)
Dept: BARIATRICS/WEIGHT MGMT | Age: 40
End: 2020-11-18
Payer: MEDICAID

## 2020-11-18 VITALS
HEIGHT: 62 IN | DIASTOLIC BLOOD PRESSURE: 68 MMHG | TEMPERATURE: 98 F | HEART RATE: 71 BPM | SYSTOLIC BLOOD PRESSURE: 110 MMHG | BODY MASS INDEX: 39.66 KG/M2 | OXYGEN SATURATION: 98 % | WEIGHT: 215.5 LBS

## 2020-11-18 PROCEDURE — G8417 CALC BMI ABV UP PARAM F/U: HCPCS | Performed by: NURSE PRACTITIONER

## 2020-11-18 PROCEDURE — G8427 DOCREV CUR MEDS BY ELIG CLIN: HCPCS | Performed by: NURSE PRACTITIONER

## 2020-11-18 PROCEDURE — 99214 OFFICE O/P EST MOD 30 MIN: CPT | Performed by: NURSE PRACTITIONER

## 2020-11-18 PROCEDURE — G8484 FLU IMMUNIZE NO ADMIN: HCPCS | Performed by: NURSE PRACTITIONER

## 2020-11-18 PROCEDURE — 1036F TOBACCO NON-USER: CPT | Performed by: NURSE PRACTITIONER

## 2020-11-18 ASSESSMENT — ENCOUNTER SYMPTOMS
NAUSEA: 0
RHINORRHEA: 0
TROUBLE SWALLOWING: 0
ABDOMINAL DISTENTION: 0
CHEST TIGHTNESS: 0
EYE PAIN: 0
WHEEZING: 0
DIARRHEA: 0
SHORTNESS OF BREATH: 0
BACK PAIN: 1
ABDOMINAL PAIN: 0

## 2020-11-18 NOTE — PROGRESS NOTES
BARIATRIC SURGERYOFFICE NOTE    SUBJECTIVE:    Patient presenting today referred from Wes Rosa, for   Chief Complaint   Patient presents with    Weight Management     2nd [address] visit   . Vitals:    11/18/20 1104   BP: 110/68   Pulse: 71   Temp: 98 °F (36.7 °C)   SpO2: 98%        BMI: Body mass index is 39.42 kg/m². Obesity Classification: III Morbid Obesity. Weight History: Wt Readings from Last 3 Encounters:   11/18/20 215 lb 8 oz (97.8 kg)   10/15/20 220 lb 4.8 oz (99.9 kg)   09/02/20 219 lb 4.8 oz (99.5 kg)       HPI: Wagner Duncan is a 45 y.o. female presenting in second bariatric visit, follow up diet and exercise - pre-operative weight loss, in consideration for bariatric surgery. Patient dines out to a sit down restaurant 0 times per week. Patient eats fast food meals 0 times per week. Drinks mostly flavored water, water, lemonade    24 hour recall/food frequency chart:  Breakfast: 2 pc toast  Snack: none  Lunch: sliders turkey  Snack: none  Dinner: taco hamberger helped  Snack: none    Total daily calories: 1200      Exercises some exercise/stretches     Met with RD. Still needs labs and PT. Diet recall looks great, watching calories. Anxiety/depression, well managed on medication. Protein intake is good, protein at each meal.   Water intake is good, discussed lemonade. Total weight loss/gain -4.8 Lbs over 2 month. Thoroughly reviewed thepatient's medical history, family history, social history and review of systems with the patient today in the office. Please see medical record for pertinent positives.       Past Medical History:   Diagnosis Date    Abdominal cramps     \"feel like I am constantly getting punched  in my stomach- for 20 yrs\"    ADHD (attention deficit hyperactivity disorder)     Anxiety     Asthma     last flare up 7/2016    Depression     HX OTHER MEDICAL 07/29/2016    \"irreg periods for 20 yrs- had period 1/2016 and 2/2016 and none since then\"    Insomnia     Nausea       Patient Active Problem List   Diagnosis    Umbilical hernia with obstruction but no gangrene    Periumbilical abdominal pain    Rectal pain    Left upper quadrant pain     Past Surgical History:   Procedure Laterality Date    CHOLECYSTECTOMY, LAPAROSCOPIC  02/25/14    robotic assisted lap tom    COLONOSCOPY  08/22/2016    internal hemorrhoids    ENDOSCOPY, COLON, DIAGNOSTIC  2015    HERNIA REPAIR  06-03-13    Hernia Repair x's 2    HYSTERECTOMY      VENTRAL HERNIA REPAIR  08/02/2016    laprascopic ventral hernia repair with mesh         Current Outpatient Medications   Medication Sig Dispense Refill    traMADol (ULTRAM) 50 MG tablet Take 50 mg by mouth every 6 hours as needed for Pain.  promethazine (PHENERGAN) 25 MG tablet Take 25 mg by mouth every 6 hours as needed for Nausea      ibuprofen (ADVIL;MOTRIN) 600 MG tablet Take 1 tablet by mouth every 6 hours as needed for Pain 30 tablet 0    Dextromethorphan-guaiFENesin (Q-TUSSIN DM)  MG/5ML SYRP Take 5 mLs by mouth every 4 hours as needed for Cough 90 mL 0    ALPRAZolam (XANAX) 1 MG tablet Take 1 mg by mouth 2 times daily. Clair Velazquez dicyclomine (BENTYL) 10 MG capsule Take 1 capsule by mouth 3 times daily 15 capsule 3    Wheat Dextrin (BENEFIBER) POWD Take 4 g by mouth 3 times daily (with meals) 1 Can 0    zolpidem (AMBIEN) 10 MG tablet       traZODone (DESYREL) 100 MG tablet Take 100 mg by mouth nightly      buPROPion (WELLBUTRIN XL) 150 MG XL tablet 450 mg every morning \"take 150 mg and 300 mg tab together      albuterol (PROVENTIL HFA) 108 (90 BASE) MCG/ACT inhaler Inhale 2 puffs into the lungs every 6 hours as needed for Wheezing. 1 Inhaler 3    Spacer/Aero-Holding Chambers (AEROCHAMBER) MISC Inhale 1 Device into the lungs every 6 hours. 1 each 0     No current facility-administered medications for this visit.        Allergies   Allergen Reactions    Zofran [Ondansetron Hcl] Hives    Zoloft [Sertraline Hcl]        Review of Systems   Constitutional: Negative. Negative for appetite change, fatigue and fever. HENT: Negative for congestion, dental problem, hearing loss, rhinorrhea and trouble swallowing. Eyes: Negative for pain. Respiratory: Negative for chest tightness, shortness of breath and wheezing. Cardiovascular: Negative for chest pain, palpitations and leg swelling. Gastrointestinal: Negative for abdominal distention, abdominal pain, diarrhea and nausea. Endocrine: Negative for cold intolerance and polydipsia. Genitourinary: Negative for difficulty urinating and frequency. Musculoskeletal: Positive for arthralgias and back pain. Negative for gait problem. Skin: Negative for rash. Allergic/Immunologic: Negative for environmental allergies. Neurological: Negative for dizziness, seizures and syncope. Hematological: Does not bruise/bleed easily. Psychiatric/Behavioral: Negative for behavioral problems and suicidal ideas. OBJECTIVE:    /68   Pulse 71   Temp 98 °F (36.7 °C)   Ht 5' 2\" (1.575 m)   Wt 215 lb 8 oz (97.8 kg)   LMP 02/29/2016   SpO2 98%   BMI 39.42 kg/m²       Physical Exam  Vitals signs and nursing note reviewed. Constitutional:       Appearance: She is well-developed. Comments: Obese   HENT:      Head: Normocephalic and atraumatic. Right Ear: Hearing and ear canal normal.      Left Ear: Hearing and ear canal normal.      Nose: Nose normal.      Mouth/Throat:      Pharynx: Uvula midline. Eyes:      Conjunctiva/sclera: Conjunctivae normal.      Pupils: Pupils are equal, round, and reactive to light. Neck:      Musculoskeletal: Normal range of motion. Cardiovascular:      Rate and Rhythm: Normal rate and regular rhythm. Heart sounds: Normal heart sounds. Pulmonary:      Effort: Pulmonary effort is normal.      Breath sounds: Normal breath sounds. No decreased breath sounds, wheezing, rhonchi or rales.    Abdominal: General: Bowel sounds are normal.      Palpations: Abdomen is soft. Tenderness: There is no abdominal tenderness. Musculoskeletal: Normal range of motion. General: No tenderness. Comments: In all 4 extremities. Skin:     General: Skin is warm and dry. Coloration: Skin is not jaundiced. Findings: No rash. Neurological:      Mental Status: She is alert and oriented to person, place, and time. GCS: GCS eye subscore is 4. GCS verbal subscore is 5. GCS motor subscore is 6. Motor: No abnormal muscle tone. Psychiatric:         Behavior: Behavior normal.         Judgment: Judgment normal.         ASSESSMENT & PLAN:    1. Morbid obesity with BMI of 40.0-44.9, adult [facility])  - Patient is down -4.8 lbs since her last visit. - First visit with NP, risk stratification.   - Discussed below. - Ambulatory referral to Cardiology  - Ambulatory referral to Pulmonology  - Needs labs. - RTC 1 month with NP.     2. Pre-op evaluation  - Needs cardiac and pulmonary.   - Ambulatory referral to Cardiology  - Ambulatory referral to Pulmonology       Patientwas encouraged to journal all food intake. Keep calorie level at approximately 0330-3410. Protein intake is to be a minimum of 40-50 grams per day. Water drinking was encouraged with a goal of 64oz-128oz daily. Beverages dale calorie free except for milk and avoid soda- eliminate lemonade. Continue to increase level of physical activity. I spent 25 minutes with the patient face to face today and over 50% of the office visit today was spent in face to face counseling regarding diet and exercise, in preparation for her planned Robotic Sleeve Gastrectomy. Discussed in length complying with the dietary recommendations, complying with the preoperative workup including dietary counseling completed, exercise physiologist counseling completed, andpre-operative optimization of pulmonologist referral placed and cardiologist referral placed.   The

## 2020-12-22 ENCOUNTER — INITIAL CONSULT (OUTPATIENT)
Dept: PULMONOLOGY | Age: 40
End: 2020-12-22
Payer: MEDICAID

## 2020-12-22 ENCOUNTER — OFFICE VISIT (OUTPATIENT)
Dept: BARIATRICS/WEIGHT MGMT | Age: 40
End: 2020-12-22
Payer: MEDICAID

## 2020-12-22 VITALS
DIASTOLIC BLOOD PRESSURE: 82 MMHG | TEMPERATURE: 98.4 F | BODY MASS INDEX: 39.08 KG/M2 | RESPIRATION RATE: 18 BRPM | WEIGHT: 212.4 LBS | HEART RATE: 71 BPM | SYSTOLIC BLOOD PRESSURE: 114 MMHG | HEIGHT: 62 IN

## 2020-12-22 VITALS
HEART RATE: 83 BPM | HEIGHT: 62 IN | WEIGHT: 215 LBS | SYSTOLIC BLOOD PRESSURE: 118 MMHG | BODY MASS INDEX: 39.56 KG/M2 | OXYGEN SATURATION: 98 % | DIASTOLIC BLOOD PRESSURE: 72 MMHG

## 2020-12-22 PROBLEM — E66.01 MORBID OBESITY (HCC): Status: ACTIVE | Noted: 2020-12-22

## 2020-12-22 PROBLEM — Z87.09 HISTORY OF ASTHMA: Status: ACTIVE | Noted: 2020-12-22

## 2020-12-22 PROBLEM — Z01.811 PREOP PULMONARY/RESPIRATORY EXAM: Status: ACTIVE | Noted: 2020-12-22

## 2020-12-22 PROBLEM — R06.09 DYSPNEA ON EXERTION: Status: ACTIVE | Noted: 2020-12-22

## 2020-12-22 LAB
EXPIRATORY TIME-POST: NORMAL
EXPIRATORY TIME: NORMAL
FEF 25-75% %CHNG: NORMAL
FEF 25-75% %PRED-POST: NORMAL
FEF 25-75% %PRED-PRE: NORMAL
FEF 25-75% PRED: NORMAL
FEF 25-75%-POST: NORMAL
FEF 25-75%-PRE: NORMAL
FEV1 %PRED-POST: 68.8 %
FEV1 %PRED-PRE: 77 %
FEV1 PRED: 2.38 L
FEV1-POST: 1.64 L
FEV1-PRE: 1.84 L
FEV1/FVC %PRED-POST: 99.8 %
FEV1/FVC %PRED-PRE: 97.1 %
FEV1/FVC PRED: 83.7 %
FEV1/FVC-POST: 83.5 %
FEV1/FVC-PRE: 81.3 %
FVC %PRED-POST: 68.3 L
FVC %PRED-PRE: 78.6 %
FVC PRED: 2.88 L
FVC-POST: 1.96 L
FVC-PRE: 2.26 L
PEF %PRED-POST: NORMAL
PEF %PRED-PRE: NORMAL
PEF PRED: NORMAL
PEF%CHNG: NORMAL
PEF-POST: NORMAL
PEF-PRE: NORMAL

## 2020-12-22 PROCEDURE — G8417 CALC BMI ABV UP PARAM F/U: HCPCS | Performed by: INTERNAL MEDICINE

## 2020-12-22 PROCEDURE — 1036F TOBACCO NON-USER: CPT | Performed by: SURGERY

## 2020-12-22 PROCEDURE — G8417 CALC BMI ABV UP PARAM F/U: HCPCS | Performed by: SURGERY

## 2020-12-22 PROCEDURE — G8427 DOCREV CUR MEDS BY ELIG CLIN: HCPCS | Performed by: INTERNAL MEDICINE

## 2020-12-22 PROCEDURE — 99213 OFFICE O/P EST LOW 20 MIN: CPT | Performed by: SURGERY

## 2020-12-22 PROCEDURE — G8484 FLU IMMUNIZE NO ADMIN: HCPCS | Performed by: SURGERY

## 2020-12-22 PROCEDURE — 99204 OFFICE O/P NEW MOD 45 MIN: CPT | Performed by: INTERNAL MEDICINE

## 2020-12-22 PROCEDURE — 1036F TOBACCO NON-USER: CPT | Performed by: INTERNAL MEDICINE

## 2020-12-22 PROCEDURE — G8484 FLU IMMUNIZE NO ADMIN: HCPCS | Performed by: INTERNAL MEDICINE

## 2020-12-22 PROCEDURE — G8427 DOCREV CUR MEDS BY ELIG CLIN: HCPCS | Performed by: SURGERY

## 2020-12-22 ASSESSMENT — SLEEP AND FATIGUE QUESTIONNAIRES
NECK CIRCUMFERENCE (INCHES): 16
HOW LIKELY ARE YOU TO NOD OFF OR FALL ASLEEP WHILE SITTING INACTIVE IN A PUBLIC PLACE: 0
HOW LIKELY ARE YOU TO NOD OFF OR FALL ASLEEP WHILE SITTING QUIETLY AFTER LUNCH WITHOUT ALCOHOL: 0
HOW LIKELY ARE YOU TO NOD OFF OR FALL ASLEEP IN A CAR, WHILE STOPPED FOR A FEW MINUTES IN TRAFFIC: 0
ESS TOTAL SCORE: 0
HOW LIKELY ARE YOU TO NOD OFF OR FALL ASLEEP WHILE WATCHING TV: 0
HOW LIKELY ARE YOU TO NOD OFF OR FALL ASLEEP WHEN YOU ARE A PASSENGER IN A CAR FOR AN HOUR WITHOUT A BREAK: 0
HOW LIKELY ARE YOU TO NOD OFF OR FALL ASLEEP WHILE LYING DOWN TO REST IN THE AFTERNOON WHEN CIRCUMSTANCES PERMIT: 0
HOW LIKELY ARE YOU TO NOD OFF OR FALL ASLEEP WHILE SITTING AND TALKING TO SOMEONE: 0
HOW LIKELY ARE YOU TO NOD OFF OR FALL ASLEEP WHILE SITTING AND READING: 0

## 2020-12-22 ASSESSMENT — ENCOUNTER SYMPTOMS
NAUSEA: 0
ALLERGIC/IMMUNOLOGIC NEGATIVE: 1
RESPIRATORY NEGATIVE: 1
DIARRHEA: 0
CONSTIPATION: 0
EYES NEGATIVE: 1
ABDOMINAL PAIN: 0
VOMITING: 0

## 2020-12-22 ASSESSMENT — PULMONARY FUNCTION TESTS
FEV1/FVC_PERCENT_PREDICTED_PRE: 97.1
FVC_PREDICTED: 2.88
FVC_POST: 1.96
FEV1/FVC_PREDICTED: 83.7
FEV1_POST: 1.64
FEV1_PREDICTED: 2.38
FEV1_PRE: 1.84
FEV1/FVC_PRE: 81.3
FEV1/FVC_PERCENT_PREDICTED_POST: 99.8
FEV1_PERCENT_PREDICTED_POST: 68.8
FVC_PERCENT_PREDICTED_PRE: 78.6
FVC_PERCENT_PREDICTED_POST: 68.3
FEV1_PERCENT_PREDICTED_PRE: 77.0
FEV1/FVC_POST: 83.5
FVC_PRE: 2.26

## 2020-12-22 NOTE — PROGRESS NOTES
 Umbilical hernia with obstruction but no gangrene    Periumbilical abdominal pain    Rectal pain    Left upper quadrant pain    Preop pulmonary/respiratory exam    History of asthma    Morbid obesity ([address]    Dyspnea on exertion       Past Surgical History:   Procedure Laterality Date    CHOLECYSTECTOMY, LAPAROSCOPIC  02/25/14    robotic assisted lap tom    COLONOSCOPY  08/22/2016    internal hemorrhoids    ENDOSCOPY, COLON, DIAGNOSTIC  2015    HERNIA REPAIR  06-03-13    Hernia Repair x's 2    HYSTERECTOMY      VENTRAL HERNIA REPAIR  08/02/2016    laprascopic ventral hernia repair with mesh        Current Outpatient Medications   Medication Sig Dispense Refill    traMADol (ULTRAM) 50 MG tablet Take 50 mg by mouth every 6 hours as needed for Pain.  promethazine (PHENERGAN) 25 MG tablet Take 25 mg by mouth every 6 hours as needed for Nausea      ibuprofen (ADVIL;MOTRIN) 600 MG tablet Take 1 tablet by mouth every 6 hours as needed for Pain (Patient not taking: Reported on 12/22/2020) 30 tablet 0    Dextromethorphan-guaiFENesin (Q-TUSSIN DM)  MG/5ML SYRP Take 5 mLs by mouth every 4 hours as needed for Cough (Patient not taking: Reported on 12/22/2020) 90 mL 0    ALPRAZolam (XANAX) 1 MG tablet Take 1 mg by mouth 2 times daily. Jackie Mingo dicyclomine (BENTYL) 10 MG capsule Take 1 capsule by mouth 3 times daily (Patient not taking: Reported on 12/22/2020) 15 capsule 3    buPROPion (WELLBUTRIN XL) 150 MG XL tablet 450 mg every morning \"take 150 mg and 300 mg tab together      albuterol (PROVENTIL HFA) 108 (90 BASE) MCG/ACT inhaler Inhale 2 puffs into the lungs every 6 hours as needed for Wheezing. (Patient not taking: Reported on 12/22/2020) 1 Inhaler 3    Spacer/Aero-Holding Chambers (AEROCHAMBER) MISC Inhale 1 Device into the lungs every 6 hours. (Patient not taking: Reported on 12/22/2020) 1 each 0     No current facility-administered medications for this visit.          Allergies Allergen Reactions    Zofran [Ondansetron Hcl] Hives    Zoloft [Sertraline Hcl]          Review of Systems   Constitutional: Negative. HENT: Negative. Eyes: Negative. Respiratory: Negative. Cardiovascular: Negative. Gastrointestinal: Negative for abdominal pain, constipation, diarrhea, nausea and vomiting. Endocrine: Negative. Genitourinary: Negative. Musculoskeletal: Positive for arthralgias. Skin: Negative. Allergic/Immunologic: Negative. Neurological: Negative. Hematological: Negative. Psychiatric/Behavioral: Negative. OBJECTIVE:    /82   Pulse 71   Temp 98.4 °F (36.9 °C) (Infrared)   Resp 18   Ht 5' 2\" (1.575 m)   Wt 212 lb 6.4 oz (96.3 kg)   LMP 02/29/2016   BMI 38.85 kg/m²      Physical Exam  Vitals signs reviewed. Constitutional:       General: She is not in acute distress. Appearance: She is obese. She is not ill-appearing, toxic-appearing or diaphoretic. HENT:      Head: Normocephalic and atraumatic. Right Ear: External ear normal.      Left Ear: External ear normal.      Nose: Nose normal.   Eyes:      General:         Right eye: No discharge. Left eye: No discharge. Extraocular Movements: Extraocular movements intact. Neck:      Musculoskeletal: Normal range of motion. Cardiovascular:      Rate and Rhythm: Normal rate. Pulmonary:      Effort: No respiratory distress. Abdominal:      Palpations: Abdomen is soft. Tenderness: There is no abdominal tenderness. Musculoskeletal:         General: No swelling. Skin:     General: Skin is warm. Neurological:      General: No focal deficit present. Mental Status: She is alert. Psychiatric:         Mood and Affect: Mood normal.         ASSESSMENT & PLAN:    1. Morbid obesity due to excess calories (HCC)  -Pulm clearance reviewed.  -Awaiting Psych and Cards clearances.   -Continue to track calories and exercise. -Needs preop EGD. Consent obtained. Reviewed in detail with the patient and/or family the expected pre-operative, operative, and post-operative courses including risks, benefits, and alternatives to the procedure. The patient's questions were answered in detail and agreed to proceed with the procedure. Needs preop COVID test. The patient was counseled at length about the risks of victoriano Covid-19 during their perioperative period and any recovery window from their procedure. The patient was made aware that victoriano Covid-19  may worsen their prognosis for recovering from their procedure  and lend to a higher morbidity and/or mortality risk. All material risks, benefits, and reasonable alternatives including postponing the procedure were discussed. The patient does wish to proceed with the procedure at this time.    -Needs smoking cessation at least 6 weeks prior to surgery. D/w pt importance of this. Patient was encouraged to journal all food intake. Keep calorie level at approximately 1200, per discussion / plan with registered dietician. Protein intake is to be a minimum of 40-50 grams per day. Water drinking was encouraged with a goal of 64oz-128oz daily. Beverages are to be calorie free except for milk. Avoid soda. Continue to increase level of physical activity. I spent 25 minutes with the patient face to face today and over 50% of the office visit today was spent in face to face counseling regarding diet and exercise, in preparation for her planned Robotic Sleeve Gastrectomy. Discussed in length complying with the dietary recommendations, complying with the preoperative workup including dietary counseling , exercise physiologist counseling , and pre-operative optimization of pulmonologist  and cardiologist .    The patient expressed understanding and willingness to comply nicely; all questions and concerns addressed.

## 2020-12-22 NOTE — PROGRESS NOTES
Subjective:   CHIEF COMPLAINT / HPI: Preoperative pulmonary exam, morbid obesity, dyspnea exertion, history of asthma  Belinda Mckoy is a 67-year-old female undergoing evaluation for bariatric surgery for morbid obesity. She states that she has an inhaler in the past and seems to think she was told she was asthmatic but states that she does not have other signs or symptoms suggesting asthma and rarely gets episodes of bronchitis. She is a noncigarette smoker currently and was 1/2 pack a day smoker for 12 years. She does use marijuana. She is not aware of a family history of chronic lung disease or asthma. Her significant other does mention that she snores but has not had witnessed apnea. She does not awaken snorting or gasping for air at nighttime. She notices mild dyspnea exertion but has no shortness of breath at rest and no chest discomfort. Office spirometry demonstrates an FEV1 of 1.84 L with an FVC of 2.26 L. There is no significant airways obstruction. Following bronchodilator she did have a mild response in her small airways    Past Medical History:  Past Medical History:   Diagnosis Date    Abdominal cramps     \"feel like I am constantly getting punched  in my stomach- for 20 yrs\"    ADHD (attention deficit hyperactivity disorder)     Anxiety     Asthma     last flare up 7/2016    Depression     Dyspnea on exertion 12/22/2020    History of asthma 12/22/2020    HX OTHER MEDICAL 07/29/2016    \"irreg periods for 20 yrs- had period 1/2016 and 2/2016 and none since then\"    Insomnia     Morbid obesity ([facility] [address] 12/22/2020    Nausea     Preop pulmonary/respiratory exam 12/22/2020       Current Medications:    No current facility-administered medications for this visit.      Allergies   Allergen Reactions    Zofran [Ondansetron Hcl] Hives    Zoloft [Sertraline Hcl]        Social History:    Social History     Socioeconomic History    Marital status: Single     Spouse name: None  Number of children: None    Years of education: None    Highest education level: None   Occupational History    None   Social Needs    Financial resource strain: None    Food insecurity     Worry: None     Inability: None    Transportation needs     Medical: None     Non-medical: None   Tobacco Use    Smoking status: Former Smoker     Packs/day: 0.50     Years: 12.00     Pack years: 6.00     Types: Cigarettes     Quit date: 2012     Years since quittin.9    Smokeless tobacco: Never Used   Substance and Sexual Activity    Alcohol use: Yes     Frequency: Monthly or less     Comment: occasionally    Drug use: Yes     Frequency: 7.0 times per week     Types: Marijuana    Sexual activity: Yes     Partners: Female     Birth control/protection: Surgical   Lifestyle    Physical activity     Days per week: None     Minutes per session: None    Stress: None   Relationships    Social connections     Talks on phone: None     Gets together: None     Attends [employer] service: None     Active member of club or organization: None     Attends meetings of clubs or organizations: None     Relationship status: None    Intimate partner violence     Fear of current or ex partner: None     Emotionally abused: None     Physically abused: None     Forced sexual activity: None   Other Topics Concern    None   Social History Narrative    None       Family History:    Family History   Problem Relation Age of Onset    Heart Disease Maternal Grandfather          REVIEW OF SYSTEMS:    CONSTITUTIONAL:  negative for fevers, chills, diaphoresis, appetite change, night sweats   HEENT:  negative for hearing loss,  sinus pressure, nasal congestion, epistaxis   RESPIRATORY:  See HPI  CARDIOVASCULAR:  negative for chest pain, palpitations, exertional chest pressure/discomfort, edema, syncope  GASTROINTESTINAL: negative for nausea, vomiting, diarrhea, constipation, blood in stool 1. Preop pulmonary/respiratory exam    2. History of asthma    3. Morbid obesity ([address]    4. Dyspnea on exertion        Plan:   She has full pulmonary clearance for general anesthesia and bariatric surgery. She might have a slight asthmatic tendency and postoperatively may require inhaled bronchodilator. We have discussed the need to maintain yearly flu immunization, pneumococcal vaccination. We have discussed Coronavirus precaution including handwashing practice, wiping items touched in public such as gas pumps, door handles, shopping carts, etc. Self monitoring for infection - fever, chills, cough, SOB. Should they develop symptoms they should call office for further instructions. No follow-ups on file. This dictation was performed with a verbal recognition program and it was checked for errors. It is possible that there are still dictated errors within this office note. Any errors should be brought immediately to my attention for correction. All efforts were made to ensure that this office note is accurate.

## 2021-01-05 ENCOUNTER — INITIAL CONSULT (OUTPATIENT)
Dept: CARDIOLOGY CLINIC | Age: 41
End: 2021-01-05
Payer: MEDICAID

## 2021-01-05 VITALS
DIASTOLIC BLOOD PRESSURE: 78 MMHG | HEIGHT: 62 IN | HEART RATE: 69 BPM | WEIGHT: 213 LBS | SYSTOLIC BLOOD PRESSURE: 124 MMHG | BODY MASS INDEX: 39.2 KG/M2

## 2021-01-05 DIAGNOSIS — R06.09 DYSPNEA ON EXERTION: ICD-10-CM

## 2021-01-05 DIAGNOSIS — Z01.811 PRE-OP CHEST EXAM: Primary | ICD-10-CM

## 2021-01-05 DIAGNOSIS — E66.09 CLASS 2 OBESITY DUE TO EXCESS CALORIES WITH BODY MASS INDEX (BMI) OF 38.0 TO 38.9 IN ADULT, UNSPECIFIED WHETHER SERIOUS COMORBIDITY PRESENT: ICD-10-CM

## 2021-01-05 PROCEDURE — G8484 FLU IMMUNIZE NO ADMIN: HCPCS | Performed by: INTERNAL MEDICINE

## 2021-01-05 PROCEDURE — 99203 OFFICE O/P NEW LOW 30 MIN: CPT | Performed by: INTERNAL MEDICINE

## 2021-01-05 PROCEDURE — G8417 CALC BMI ABV UP PARAM F/U: HCPCS | Performed by: INTERNAL MEDICINE

## 2021-01-05 PROCEDURE — 93000 ELECTROCARDIOGRAM COMPLETE: CPT | Performed by: INTERNAL MEDICINE

## 2021-01-05 PROCEDURE — G8427 DOCREV CUR MEDS BY ELIG CLIN: HCPCS | Performed by: INTERNAL MEDICINE

## 2021-01-05 PROCEDURE — 1036F TOBACCO NON-USER: CPT | Performed by: INTERNAL MEDICINE

## 2021-01-05 NOTE — PROGRESS NOTES
Mary Anne Walker MD                                  CARDIOLOGY  NOTE        Referring Physician: Walker Meyers*    Thank you for consultation. Chief Complaint:    Chief Complaint   Patient presents with    Cardiac Clearance        HPI:     Susie Arzola is a 45y.o. year old female with prior medical history significant for morbid obesity with BMI of 38.85, history of shortness of breath and asthma presents to the clinic for preop assessment for gastric sleeve, weight loss surgery. Patient denies any chest pain. Has chronic shortness of breath with exertion, history of asthma. Patient is a former smoker who quit smoking 6 years ago. EKG shows normal sinus rhythm  No current cardiac symptoms         Current Outpatient Medications   Medication Sig Dispense Refill    traMADol (ULTRAM) 50 MG tablet Take 50 mg by mouth every 6 hours as needed for Pain.  promethazine (PHENERGAN) 25 MG tablet Take 25 mg by mouth every 6 hours as needed for Nausea      ibuprofen (ADVIL;MOTRIN) 600 MG tablet Take 1 tablet by mouth every 6 hours as needed for Pain (Patient not taking: Reported on 12/22/2020) 30 tablet 0    Dextromethorphan-guaiFENesin (Q-TUSSIN DM)  MG/5ML SYRP Take 5 mLs by mouth every 4 hours as needed for Cough (Patient not taking: Reported on 12/22/2020) 90 mL 0    ALPRAZolam (XANAX) 1 MG tablet Take 1 mg by mouth 2 times daily. Oneil [place] dicyclomine (BENTYL) 10 MG capsule Take 1 capsule by mouth 3 times daily (Patient not taking: Reported on 12/22/2020) 15 capsule 3    buPROPion (WELLBUTRIN XL) 150 MG XL tablet 450 mg every morning \"take 150 mg and 300 mg tab together      albuterol (PROVENTIL HFA) 108 (90 BASE) MCG/ACT inhaler Inhale 2 puffs into the lungs every 6 hours as needed for Wheezing.  (Patient not taking: Reported on 12/22/2020) 1 Inhaler 3 · Respiratory: No cough or wheezing . No Respiratory distress   · Gastrointestinal: No abdominal pain, appetite loss, blood in stools, constipation, diarrhea or heartburn  · Genitourinary: No dysuria, trouble voiding, or hematuria  · Musculoskeletal:  denies any new  joint aches , or pain   · Integumentary: No rash or pruritis  · Neurological: No TIA or stroke symptoms  · Psychiatric: No anxiety or depression  · Endocrine: No malaise, fatigue or temperature intolerance  · Hematologic/Lymphatic: No bleeding problems, blood clots or swollen lymph nodes  · Allergic/Immunologic: No nasal congestion or hives        Objective:      Physical Exam:    /78   Pulse 69   Ht 5' 2\" (1.575 m)   Wt 213 lb (96.6 kg)   LMP 02/29/2016   BMI 38.96 kg/m²   Wt Readings from Last 3 Encounters:   01/05/21 213 lb (96.6 kg)   12/22/20 212 lb 6.4 oz (96.3 kg)   12/22/20 215 lb (97.5 kg)     Body mass index is 38.96 kg/m². Vitals:    01/05/21 1543   BP: 124/78   Pulse: 69        General Appearance and Constitutional: Conversant, Well developed, Well nourished, No acute distress, Non-toxic appearance. HEENT:  Normocephalic, Atraumatic, Bilateral external ears normal, Oropharynx moist, No oral exudates,   Nose normal.   Neck- Normal range of motion, No tenderness, Supple  Eyes:  EOMI, Conjunctiva normal, No discharge. Respiratory:  Normal breath sounds, No respiratory distress, No wheezing, No Rales, No Ronchi. No chest tenderness. Cardiovascular: S1-S2, no added heart sounds, No Mumurs appreciated. No gallops, rubs. No Pedal Edema   GI:  Bowel sounds normal, Soft, No tenderness,  :  No costovertebral angle tenderness   Musculoskeletal:  No gross deformities.  Back- No tenderness  Integument:  Well hydrated, no rash   Lymphatic:  No lymphadenopathy noted   Neurologic:  Alert & oriented x 3, Normal motor function, normal sensory function, no focal deficits noted   Psychiatric:  Speech and behavior appropriate Medical decision making and Data review:    DATA:    Lab Results   Component Value Date    TROPONINT <0.010 02/02/2015     BNP:  No results found for: PROBNP  PT/INR:  No results found for: PTINR  No results found for: LABA1C  No results found for: CHOL, TRIG, HDL, LDLCALC, LDLDIRECT  Lab Results   Component Value Date    WBC 7.5 12/15/2019    HGB 12.5 12/15/2019    HCT 39.2 12/15/2019    MCV 91.6 12/15/2019     12/15/2019     TSH: No results found for: TSH  Lab Results   Component Value Date    AST 18 12/15/2019    ALT 14 12/15/2019    BILITOT 0.3 12/15/2019    ALKPHOS 93 12/15/2019         All labs, medications and tests reviewed by myself including data and history from outside source , patient and available family . 1. Pre-op chest exam    2. Class 2 obesity due to excess calories with body mass index (BMI) of 38.0 to 38.9 in adult, unspecified whether serious comorbidity present    3. Dyspnea on exertion         Impression and Plan:      1. Preop cardiac risk assessment: Patient is morbidly obese and is to undergo gastric sleeve weight loss surgery. Will obtain exercise stress test and echocardiogram for risk stratification prior to surgery. 2. Shortness of breath with exertion: Occasional likely secondary to asthma. Obtain echocardiogram to rule out any structural heart disease. 3. Morbid obesity with BMI of 38.85. Patient was counseled about weight loss, exercise, low-salt low-fat diet. 4. Asthma: Stable, on as needed albuterol. Return in about 1 month (around 2/5/2021). Counseled extensively and medication compliance urged. We discussed that for the  prevention of ASCVD our  goal is aggressive risk modification. Patient is encouraged to exercise even a brisk walk for 30 minutes  at least 3 to 4 times a week   Various goals were discussed and questions answered. Continue current medications. Appropriate prescriptions are addressed and refills ordered. Questions answered and patient verbalizes understanding. Call for any problems, questions, or concerns.

## 2021-01-05 NOTE — LETTER
Kathy Hackett Sportsman  1980  [id number]    Have you had any Chest Pain that is not new? - No    ? DO EKG IF: Patient has a Heart Rate above 100 or below 40     CAD (Coronary Artery Disease) patient should have one on file every 6 months        Have you had any Shortness of Breath - No      Have you had any dizziness - No       ? Sitting wait 5 minutes do supine (laying down) wait 5 minutes then do standing - log each in \"vitals\" area in Epic  ? Be sure to ask what symptoms they are having if they get dizzy while completing ortho stats such as room spinning, nausea, etc.    Have you had any palpitations that are not new? - No      Is the patient on any of the following medications -    Do you have any edema - swelling in No        Do you have a surgery or procedure scheduled in the near future - Yes  If Yes- DO EKG  If Yes - Who is the surgery with? Phone number of physician   Fax number of physician   Type of surgery   GIVE THIS INFORMATION TO ADITHYA SPEAR    ? Ask patient if they want to [facility] if they are not already signed up    ? Check to see if we have an E-MAIL on file for the patient    ? Check medication list thoroughly!!! AND RECONCILE OUTSIDE MEDICATIONS  If dose has changed change the entire order not just the MG  BE SURE TO ASK PATIENT IF THEY NEED MEDICATION REFILLS    ?  At check out add to every patient's \"wrap up\" the following dot phrase AFTERHOURSEDUCATION and ensure we explain this to our patients

## 2021-01-05 NOTE — PATIENT INSTRUCTIONS
Please be informed that if you contact our office outside of normal business hours the physician on call cannot help with any scheduling or rescheduling issues, procedure instruction questions or any type of medication issue. We advise you for any urgent/emergency that you go to the nearest emergency room! PLEASE CALL OUR OFFICE DURING NORMAL BUSINESS HOURS    Monday - Friday   8 am to 5 pm    [address]: [phone]    [place]:  [phone]    Please hold on to these instructions the  will call you within 1-9 business days when we receive authorization from your insurance. Treadmill Stress test    WHAT TO EXPECT:     The exercise stress test is a test used to provide information about how the heart responds to exertion. It involves walking on a treadmill at increasing levels of difficulty, while electrocardiogram, heart rate, and blood pressure are monitored. ? This test will take approximately 1 hours: Please arrive at the office 5-10 min before the scheduled testing time. ? Once you are taken back to the stress lab you will be asked to read and sign a consent before proceeding with the test. At this time feel free to ask any question that you may have as the procedure is explained to you.   ? You will be attached to the EKG monitoring equipment, your blood pressure will be taken, and you will begin walking on the treadmill. The treadmill starts off slowly and every 3 minutes the treadmill speeds up and the elevation increases. The average person usually walks for a period of 6-8min. PREPARATION FOR TEST:    ? Eat a light meal such as juice and toast at least 2 hours prior to the procedure. ? AVOID CAFFEINE 24 HOURS PRIOR TO THE TEST: Including coffee, Tea, Roland and other soft drinks even those labeled  caffeine free or decaffeinated. ? Please wear loose comfortable clothing and comfortable walking shoes. Please wear a short sleeved shirt. ? Please shower or bath and do not apply powder or lotion to the skin prior to testing, as the electrodes will adhere better giving us a clearer visual EKG recording. ? DO NOT TAKE BETA-BLOCKERS 24 HOURS PRIOR TO  ?  TESTING SUCH AS:     Please hold on to these instructions the  will call you within 1-9 business days when we receive authorization from your insurance. Echocardiogram    WHAT TO EXPECT:   ? This test will take approximately 45 minutes. ? It is an ultrasound of the heart. ? It can look at the valves and chambers inside the heart   ? There is no special instructions for this test.     If you are unable to keep this appointment, please notify us 24 hours prior to test at [phone].

## 2021-01-14 ENCOUNTER — PROCEDURE VISIT (OUTPATIENT)
Dept: CARDIOLOGY CLINIC | Age: 41
End: 2021-01-14
Payer: MEDICAID

## 2021-01-14 VITALS
DIASTOLIC BLOOD PRESSURE: 70 MMHG | WEIGHT: 213 LBS | BODY MASS INDEX: 39.2 KG/M2 | HEIGHT: 62 IN | HEART RATE: 80 BPM | SYSTOLIC BLOOD PRESSURE: 148 MMHG

## 2021-01-14 DIAGNOSIS — R06.09 DYSPNEA ON EXERTION: Primary | ICD-10-CM

## 2021-01-14 DIAGNOSIS — E66.09 CLASS 2 OBESITY DUE TO EXCESS CALORIES WITH BODY MASS INDEX (BMI) OF 38.0 TO 38.9 IN ADULT, UNSPECIFIED WHETHER SERIOUS COMORBIDITY PRESENT: ICD-10-CM

## 2021-01-14 DIAGNOSIS — Z01.811 PRE-OP CHEST EXAM: Primary | ICD-10-CM

## 2021-01-14 DIAGNOSIS — Z01.811 PRE-OP CHEST EXAM: ICD-10-CM

## 2021-01-14 LAB
LV EF: 58 %
LVEF MODALITY: NORMAL

## 2021-01-14 PROCEDURE — 93306 TTE W/DOPPLER COMPLETE: CPT | Performed by: INTERNAL MEDICINE

## 2021-01-14 PROCEDURE — 93015 CV STRESS TEST SUPVJ I&R: CPT | Performed by: INTERNAL MEDICINE

## 2021-01-15 ENCOUNTER — TELEPHONE (OUTPATIENT)
Dept: BARIATRICS/WEIGHT MGMT | Age: 41
End: 2021-01-15

## 2021-01-15 NOTE — TELEPHONE ENCOUNTER
SPOKE TO / LEFT MESSAGE FOR Lindsay Zambrano REGARDING SURGERY (EGD WITH BX) SCHEDULED @ [facility].  NOTIFIED OF DATES, TIMES AND LOCATION    PHONE ASSESSMENT   COVID - GETS TESTED AT [address] [address] - 2/5/21 @ 800  P/O -     NPO AFTER MIDNIGHT  HOLD BLOOD THINNERS 5 DAYS PRIOR IF TAKING ANY

## 2021-01-18 ENCOUNTER — TELEPHONE (OUTPATIENT)
Dept: BARIATRICS/WEIGHT MGMT | Age: 41
End: 2021-01-18

## 2021-01-21 PROBLEM — Z01.811 PREOP PULMONARY/RESPIRATORY EXAM: Status: RESOLVED | Noted: 2020-12-22 | Resolved: 2021-01-21

## 2021-01-27 ENCOUNTER — TELEMEDICINE (OUTPATIENT)
Dept: BARIATRICS/WEIGHT MGMT | Age: 41
End: 2021-01-27
Payer: MEDICAID

## 2021-01-27 DIAGNOSIS — E66.01 MORBID OBESITY WITH BMI OF 40.0-44.9, ADULT (HCC): ICD-10-CM

## 2021-01-27 DIAGNOSIS — Z01.818 PRE-OP EVALUATION: Primary | ICD-10-CM

## 2021-01-27 PROCEDURE — G8428 CUR MEDS NOT DOCUMENT: HCPCS | Performed by: NURSE PRACTITIONER

## 2021-01-27 PROCEDURE — 99213 OFFICE O/P EST LOW 20 MIN: CPT | Performed by: NURSE PRACTITIONER

## 2021-01-27 ASSESSMENT — ENCOUNTER SYMPTOMS
RHINORRHEA: 0
ABDOMINAL DISTENTION: 0
DIARRHEA: 0
NAUSEA: 0
BACK PAIN: 1
WHEEZING: 0
CHEST TIGHTNESS: 0
EYE PAIN: 0
SHORTNESS OF BREATH: 0
ABDOMINAL PAIN: 0
TROUBLE SWALLOWING: 0

## 2021-01-27 NOTE — PROGRESS NOTES
2021    TELEHEALTH EVALUATION -- Audio/Visual (During LBCZL-05 public health emergency)    HPI:    Angie Wilder (:  1980) has requested an audio/video evaluation for the following concern(s):  Patient presents today for follow up [address] e visit. This is her 5th  visit. EGD scheduled. Cardiology in progress. Will come in for weight. Focusing on healthier meals. Eating every 3-4 hours. Continues to walk. Also doing weights at home. Water intake is good. Review of Systems   Constitutional: Negative. Negative for appetite change, fatigue and fever. HENT: Negative for congestion, dental problem, hearing loss, rhinorrhea and trouble swallowing. Eyes: Negative for pain. Respiratory: Negative for chest tightness, shortness of breath and wheezing. Cardiovascular: Negative for chest pain, palpitations and leg swelling. Gastrointestinal: Negative for abdominal distention, abdominal pain, diarrhea and nausea. Endocrine: Negative for cold intolerance and polydipsia. Genitourinary: Negative for difficulty urinating and frequency. Musculoskeletal: Positive for arthralgias and back pain. Negative for gait problem. Skin: Negative for rash. Allergic/Immunologic: Negative for environmental allergies. Neurological: Negative for dizziness, seizures and syncope. Hematological: Does not bruise/bleed easily. Psychiatric/Behavioral: Negative for behavioral problems and suicidal ideas. Prior to Visit Medications    Medication Sig Taking? Authorizing Provider   traMADol (ULTRAM) 50 MG tablet Take 50 mg by mouth every 6 hours as needed for Pain.   Historical Provider, MD   promethazine (PHENERGAN) 25 MG tablet Take 25 mg by mouth every 6 hours as needed for Nausea  Historical Provider, MD   ibuprofen (ADVIL;MOTRIN) 600 MG tablet Take 1 tablet by mouth every 6 hours as needed for Pain  Patient not taking: Reported on 2020  Genevieve Dong PA-C Dextromethorphan-guaiFENesin (Q-TUSSIN DM)  MG/5ML SYRP Take 5 mLs by mouth every 4 hours as needed for Cough  Patient not taking: Reported on 2020  Jossie Herring PA-C   ALPRAZolam Lilian Alar) 1 MG tablet Take 1 mg by mouth 2 times daily. Juancarlos Roman Historical Provider, MD   dicyclomine (BENTYL) 10 MG capsule Take 1 capsule by mouth 3 times daily  Patient not taking: Reported on 2020  Spencer Manzanares MD   buPROPion (WELLBUTRIN XL) 150 MG XL tablet 450 mg every morning \"take 150 mg and 300 mg tab together  Historical Provider, MD   albuterol (PROVENTIL HFA) 108 (90 BASE) MCG/ACT inhaler Inhale 2 puffs into the lungs every 6 hours as needed for Wheezing. Patient not taking: Reported on 2020  Jaswinder Gusman MD   Spacer/Aero-Holding Chambers (AEROCHAMBER) MISC Inhale 1 Device into the lungs every 6 hours.   Patient not taking: Reported on 2020  Jaswinder Gusman MD       Social History     Tobacco Use    Smoking status: Former Smoker     Packs/day: 0.50     Years: 12.00     Pack years: 6.00     Types: Cigarettes     Quit date: 2012     Years since quittin.0    Smokeless tobacco: Never Used   Substance Use Topics    Alcohol use: Yes     Frequency: Monthly or less     Comment: occasionally    Drug use: Yes     Frequency: 7.0 times per week     Types: Marijuana        Allergies   Allergen Reactions    Zofran [Ondansetron Hcl] Hives    Zoloft [Sertraline Hcl]    ,   Past Medical History:   Diagnosis Date    Abdominal cramps     \"feel like I am constantly getting punched  in my stomach- for 20 yrs\"    ADHD (attention deficit hyperactivity disorder)     Anxiety     Asthma     last flare up 2016    Depression     Dyspnea on exertion 2020    History of asthma 2020    HX OTHER MEDICAL 2016    \"irreg periods for 20 yrs- had period 2016 and 2016 and none since then\"    Insomnia     Morbid obesity ([facility] [address] 2020    Nausea  Preop pulmonary/respiratory exam 2020   ,   Past Surgical History:   Procedure Laterality Date    CHOLECYSTECTOMY, LAPAROSCOPIC  14    robotic assisted lap tom    COLONOSCOPY  2016    internal hemorrhoids    ENDOSCOPY, COLON, DIAGNOSTIC      HERNIA REPAIR  13    Hernia Repair x's 2    HYSTERECTOMY      VENTRAL HERNIA REPAIR  2016    laprascopic ventral hernia repair with mesh    ,   Social History     Tobacco Use    Smoking status: Former Smoker     Packs/day: 0.50     Years: 12.00     Pack years: 6.00     Types: Cigarettes     Quit date: 2012     Years since quittin.0    Smokeless tobacco: Never Used   Substance Use Topics    Alcohol use: Yes     Frequency: Monthly or less     Comment: occasionally    Drug use: Yes     Frequency: 7.0 times per week     Types: Marijuana       PHYSICAL EXAMINATION:  Physical Exam  Constitutional:       Appearance: She is well-developed. Comments: Obese   HENT:      Head: Normocephalic and atraumatic. Right Ear: Hearing and ear canal normal.      Left Ear: Hearing and ear canal normal.      Nose: Nose normal.      Eyes:      Conjunctiva/sclera: Conjunctivae normal.   Neck:      Musculoskeletal: Normal range of motion. Cardiovascular:   N/A  Pulmonary:      Effort: Pulmonary effort is normal.   Musculoskeletal: Normal range of motion. Comments: In all 4 extremities. Skin:     General: Skin is warm and dry. Findings: No rash. Neurological:      Mental Status: She is alert and oriented to person, place, and time. GCS: GCS eye subscore is 4. GCS verbal subscore is 5. GCS motor subscore is 6. Motor: No abnormal muscle tone. Psychiatric:         Behavior: Behavior normal.         Judgment: Judgment normal.     ASSESSMENT/PLAN:  1. Pre-op evaluation  - Continue working on clearances. - Amb External Referral To Psychology    2.  Morbid obesity with BMI of 40.0-44.9, adult ([facility] [address] - needs to come in for visits, no scale. - Will come in tomorrow. - Given PT number and will needs psych and labs as well. - EGD scheduled. - Amb External Referral To Psychology  - She is down around -6 lbs total.   - RTC 1 month with NP, in person. Return in about 1 month (around 2/27/2021) for Weight Check. Sal Cabrera is a 45 y.o. female being evaluated by a Virtual Visit (video visit) encounter to address concerns as mentioned above. A caregiver was present when appropriate. Due to this being a TeleHealth encounter (During EXESG-29 public health emergency), evaluation of the following organ systems was limited: Vitals/Constitutional/EENT/Resp/CV/GI//MS/Neuro/Skin/Heme-Lymph-Imm. Pursuant to the emergency declaration under the [address] [place] [address], [address] [place] [address] authority and the [employer] Act, this Virtual Visit was conducted with patient's (and/or legal guardian's) consent, to reduce the patient's risk of exposure to COVID-19 and provide necessary medical care. The patient (and/or legal guardian) has also been advised to contact this office for worsening conditions or problems, and seek emergency medical treatment and/or call 911 if deemed necessary. Patient identification was verified at the start of the visit: Yes    Total time spent on this encounter: 15    Services were provided through a video synchronous discussion virtually to substitute for in-person clinic visit. Patient and provider were located at their individual homes.     --WENDY Rodgers - CNP on 1/27/2021 at 4:53 PM

## 2021-01-28 ENCOUNTER — NURSE ONLY (OUTPATIENT)
Dept: GASTROENTEROLOGY | Age: 41
End: 2021-01-28

## 2021-01-28 VITALS
TEMPERATURE: 98.2 F | DIASTOLIC BLOOD PRESSURE: 60 MMHG | HEIGHT: 62 IN | HEART RATE: 78 BPM | WEIGHT: 209.1 LBS | BODY MASS INDEX: 38.48 KG/M2 | SYSTOLIC BLOOD PRESSURE: 108 MMHG

## 2021-01-28 DIAGNOSIS — E66.01 MORBID OBESITY (HCC): Primary | ICD-10-CM

## 2021-01-29 ENCOUNTER — TELEPHONE (OUTPATIENT)
Dept: BARIATRICS/WEIGHT MGMT | Age: 41
End: 2021-01-29

## 2021-01-29 NOTE — TELEPHONE ENCOUNTER
LM FOR NALINI THAT THE COVID TEST THAT IS DONE WHERE WORK IS NOT ACCEPTABLE, AND SHE WILL NEED TO COME INTO THE OFFICE TO HAVE IT DONE OR HER PROCEDURE WILL NEED TO BE CANCELLED.

## 2021-01-29 NOTE — PROGRESS NOTES
Surgery:  [email or web address]                        Arrival time: 0600  Nothing to eat or drink after midnight unless instructed to take certain medications by the doctor or the nurse the am of surgery  Arrive at the front information desk -1st floor /[facility] is on [address] [facility]  Please leave money and all other valuables at home. Wear comfortable clothing. If you wear contacts please bring a case. No make up. You may be asked to remove rings or body piercing. Please bring insurance cards and picture ID am of procedure. Please bring any consent or paper work from your doctor. If you become ill,such as a cold, sore throat or fever contact your doctor. Please bathe or shower am of procedure.   Medications to take AM of procedure:     Any questions call [facility]  [phone]

## 2021-01-29 NOTE — PROGRESS NOTES
Called DR. Matthews [facility] office to let them know That I spoke to pt and she had COVID test done at [facility] where she works but has no official result from a lab and they said that they would have her come into their office to have her do a COVID test.

## 2021-02-01 ENCOUNTER — HOSPITAL ENCOUNTER (OUTPATIENT)
Age: 41
Setting detail: SPECIMEN
Discharge: HOME | End: 2021-02-01
Payer: MEDICAID

## 2021-02-01 ENCOUNTER — NURSE ONLY (OUTPATIENT)
Dept: SURGERY | Age: 41
End: 2021-02-01
Payer: MEDICAID

## 2021-02-01 VITALS — OXYGEN SATURATION: 98 % | RESPIRATION RATE: 14 BRPM | HEART RATE: 73 BPM | TEMPERATURE: 98.1 F

## 2021-02-01 DIAGNOSIS — Z01.818 PRE-OP TESTING: Primary | ICD-10-CM

## 2021-02-01 PROCEDURE — U0002 COVID-19 LAB TEST NON-CDC: HCPCS

## 2021-02-01 PROCEDURE — 99211 OFF/OP EST MAY X REQ PHY/QHP: CPT | Performed by: SURGERY

## 2021-02-01 NOTE — PROGRESS NOTES
Patient collected pre-op COVID-19 screening instruction and collection supplies given to patient accordingly. Patient denies fever/cough/sob or recent travels. Patient voiced understanding of collection/quarantine instructions. COVID screening lab ordered, collection completed without difficulty, identifiers placed on specimen. AVS given at discharge. Results will be given via mychart or telephone call [facility] Dept will manage any positive test results, the procedure will be rescheduled at a later date).

## 2021-02-01 NOTE — PATIENT INSTRUCTIONS
Pre-Procedure COVID-19 Self Testing  Quarantine Instructions  Day of Surgery Instructions         What to do before my surgery:   ? All patients scheduled for elective surgery must test for COVID19 72-96 hours prior to the surgery date. ? Pre-Procedure COVID-19 Self-Test will be scheduled for you by your provider. ? You can receive your Pre-Procedure COVID-19 Self-Test at:  ?  [facility]. ? [address], [place], [address] [facility],Third [address]  ? If you do not have the COVID-19 test we will cancel or reschedule your procedure  ? Once you test you must quarantine at home until after your procedure with only your immediate family members or whoever lives with you.   ? If you must work during your quarantine period, we ask that you continue to practice social distancing, wear a mask that covers your mouth and nose and perform all hand hygiene as recommended by the CDC. ? If you must go to the grocery, etc. and cannot get someone to do this for you please wear a mask that covers your mouth and nose and perform all hand hygiene as recommended by the CDC. ? Your surgeon's office will notify you with any concerns about your test result. What can I expect on the day of surgery? ? Arrive at the time the office or hospital staff tell you on the day of your procedure. ? Wear a mask when entering the hospital.    ? A member of the hospital staff will take your temperature and ask you a few questions as you enter the building. ? In abundance of caution for the safety of all our patients and staff, please follow all hospital visitor guidelines in place at the time of your procedure. The staff caring for you will stay in close communication with your loved one and keep them updated on progress. ? Please provide a phone number for us to use when communicating with your family or ride home. When you are ready to discharge, we will notify your family/person with you to bring the car to the front entrance. We will take you to them after you receive all of your discharge instructions.

## 2021-02-02 PROBLEM — Z01.818 PREOPERATIVE CLEARANCE: Status: ACTIVE | Noted: 2021-02-02

## 2021-02-02 LAB
SARS-COV-2: NOT DETECTED
SOURCE: NORMAL

## 2021-02-04 ENCOUNTER — ANESTHESIA EVENT (OUTPATIENT)
Dept: ENDOSCOPY | Age: 41
End: 2021-02-04
Payer: MEDICAID

## 2021-02-04 NOTE — ANESTHESIA PRE PROCEDURE
Department of Anesthesiology  Preprocedure Note       Name:  Nathalie Osgood   Age:  45 y.o.  :  1980                                          MRN:  [id number]         Date:  2021      Surgeon: Suzy Muro):  Cathy Hyatt II, MD    Procedure: Procedure(s):  EGD ESOPHAGOGASTRODUODENOSCOPY    Medications prior to admission:   Prior to Admission medications    Not on File       Current medications:    No current facility-administered medications for this encounter. No current outpatient medications on file. Allergies:     Allergies   Allergen Reactions    Zofran [Ondansetron Hcl] Hives    Zoloft [Sertraline Hcl]        Problem List:    Patient Active Problem List   Diagnosis Code    Umbilical hernia with obstruction but no gangrene N13.7    Periumbilical abdominal pain R10.33    Rectal pain K62.89    Left upper quadrant pain R10.12    History of asthma Z87.09    Morbid obesity ([facility] [address] E66.01    Dyspnea on exertion R06.00    Preoperative clearance Z01.818       Past Medical History:        Diagnosis Date    Abdominal cramps     \"feel like I am constantly getting punched  in my stomach- for 20 yrs\"    ADHD (attention deficit hyperactivity disorder)     Anxiety     Asthma     last flare up 2016    Depression     Dyspnea on exertion 2020    History of asthma 2020    HX OTHER MEDICAL 2016    \"irreg periods for 20 yrs- had period 2016 and 2016 and none since then\"    Insomnia     Morbid obesity ([facility] [address] 2020    Nausea     Preop pulmonary/respiratory exam 2020       Past Surgical History:        Procedure Laterality Date    CHOLECYSTECTOMY, LAPAROSCOPIC  14    robotic assisted lap tom    COLONOSCOPY  2016    internal hemorrhoids    ENDOSCOPY, COLON, DIAGNOSTIC  2015    HERNIA REPAIR  13    Hernia Repair x's 2    HYSTERECTOMY      VENTRAL HERNIA REPAIR  2016    laprascopic ventral hernia repair with mesh Social History:    Social History     Tobacco Use    Smoking status: Former Smoker     Packs/day: 0.50     Years: 12.00     Pack years: 6.00     Types: Cigarettes     Quit date: 2012     Years since quittin.0    Smokeless tobacco: Never Used   Substance Use Topics    Alcohol use: Yes     Frequency: Monthly or less     Comment: occasionally                                Counseling given: Not Answered      Vital Signs (Current):   Vitals:    21 1216   Weight: 209 lb (94.8 kg)   Height: 5' 2\" (1.575 m)                                              BP Readings from Last 3 Encounters:   21 108/60   21 (!) 148/70   21 124/78       NPO Status:                                                                     12 hrs. BMI:   Wt Readings from Last 3 Encounters:   21 209 lb 1.6 oz (94.8 kg)   21 213 lb (96.6 kg)   21 213 lb (96.6 kg)     Body mass index is 38.23 kg/m². CBC:   Lab Results   Component Value Date    WBC 7.5 12/15/2019    RBC 4.28 12/15/2019    HGB 12.5 12/15/2019    HCT 39.2 12/15/2019    MCV 91.6 12/15/2019    RDW 13.8 12/15/2019     12/15/2019       CMP:   Lab Results   Component Value Date     12/15/2019    K 4.3 12/15/2019    CL 99 12/15/2019    CO2 27 12/15/2019    BUN 5 12/15/2019    CREATININE 0.7 12/15/2019    GFRAA >60 12/15/2019    LABGLOM >60 12/15/2019    GLUCOSE 120 12/15/2019    PROT 7.6 12/15/2019    PROT 6.9 2012    CALCIUM 9.2 12/15/2019    BILITOT 0.3 12/15/2019    ALKPHOS 93 12/15/2019    AST 18 12/15/2019    ALT 14 12/15/2019       POC Tests: No results for input(s): POCGLU, POCNA, POCK, POCCL, POCBUN, POCHEMO, POCHCT in the last 72 hours.     Coags: No results found for: PROTIME, INR, APTT    HCG (If Applicable):   Lab Results   Component Value Date    PREGTESTUR NEGATIVE 2016        ABGs: No results found for: PHART, PO2ART, HWL5DNV, ZSY9DKI, BEART, M8JOXVFA     Type & Screen (If Applicable): No results found for: LABABO, [address]    Drug/Infectious Status (If Applicable):  No results found for: HIV, HEPCAB    COVID-19 Screening (If Applicable):   Lab Results   Component Value Date    COVID19 NOT DETECTED 2021         Anesthesia Evaluation  Patient summary reviewed no history of anesthetic complications:   Airway: Mallampati: I  TM distance: >3 FB     Mouth opening: > = 3 FB Dental: normal exam         Pulmonary:normal exam    (+) asthma:                           ROS comment: Former Smoker - 6 pack years  Quit Smokin12       Cardiovascular:Negative CV ROS  Exercise tolerance: good (>4 METS),            Beta Blocker:  Not on Beta Blocker      ROS comment: Stress test 2021:  Summary   Overall Impression:   Normal Exercise Stress Test      Functional Capacity: Good Exercise Tolerance. No chest pain noted during   testing. Echo 2021:   Summary   Left ventricular systolic function is normal with an ejection fraction of   55-60%. Doppler evaluation reveals mild aortic, mitral, and tricuspid   regurgitation. Normal pulmonary artery pressure with a RVSP of 21 mmHg. No evidence of pericardial effusion. Neuro/Psych:   (+) psychiatric history:depression/anxiety             GI/Hepatic/Renal:   (+) morbid obesity          Endo/Other: Negative Endo/Other ROS                    Abdominal:   (+) obese,         Vascular: negative vascular ROS. Anesthesia Plan      MAC     ASA 2       Induction: intravenous. Anesthetic plan and risks discussed with patient. WENDY Barrientos CRNA   2021        Pre Anesthesia Evaluation complete. Anesthesia plan, risks, benefits, alternatives, and personnel discussed with patient and/or legal guardian. Patient and/or legal guardian verbalized an understanding and agreed to proceed. Anesthesia plan discussed with care team members and agreed upon.   WENDY Barrientos CRNA  2021

## 2021-02-05 ENCOUNTER — ANESTHESIA (OUTPATIENT)
Dept: ENDOSCOPY | Age: 41
End: 2021-02-05
Payer: MEDICAID

## 2021-02-05 ENCOUNTER — HOSPITAL ENCOUNTER (OUTPATIENT)
Age: 41
Setting detail: OUTPATIENT SURGERY
Discharge: HOME | End: 2021-02-05
Attending: SURGERY | Admitting: SURGERY
Payer: MEDICAID

## 2021-02-05 VITALS
BODY MASS INDEX: 38.46 KG/M2 | DIASTOLIC BLOOD PRESSURE: 83 MMHG | HEART RATE: 74 BPM | HEIGHT: 62 IN | WEIGHT: 209 LBS | RESPIRATION RATE: 16 BRPM | SYSTOLIC BLOOD PRESSURE: 136 MMHG | TEMPERATURE: 98 F | OXYGEN SATURATION: 99 %

## 2021-02-05 VITALS — DIASTOLIC BLOOD PRESSURE: 65 MMHG | SYSTOLIC BLOOD PRESSURE: 110 MMHG | OXYGEN SATURATION: 100 %

## 2021-02-05 PROCEDURE — 3609012400 HC EGD TRANSORAL BIOPSY SINGLE/MULTIPLE: Performed by: SURGERY

## 2021-02-05 PROCEDURE — 3700000001 HC ADD 15 MINUTES (ANESTHESIA): Performed by: SURGERY

## 2021-02-05 PROCEDURE — 88305 TISSUE EXAM BY PATHOLOGIST: CPT

## 2021-02-05 PROCEDURE — 88342 IMHCHEM/IMCYTCHM 1ST ANTB: CPT

## 2021-02-05 PROCEDURE — 7100000011 HC PHASE II RECOVERY - ADDTL 15 MIN: Performed by: SURGERY

## 2021-02-05 PROCEDURE — 2500000003 HC RX 250 WO HCPCS: Performed by: NURSE ANESTHETIST, CERTIFIED REGISTERED

## 2021-02-05 PROCEDURE — 3700000000 HC ANESTHESIA ATTENDED CARE: Performed by: SURGERY

## 2021-02-05 PROCEDURE — 6360000002 HC RX W HCPCS: Performed by: NURSE ANESTHETIST, CERTIFIED REGISTERED

## 2021-02-05 PROCEDURE — 2580000003 HC RX 258: Performed by: SURGERY

## 2021-02-05 PROCEDURE — 7100000010 HC PHASE II RECOVERY - FIRST 15 MIN: Performed by: SURGERY

## 2021-02-05 PROCEDURE — 2709999900 HC NON-CHARGEABLE SUPPLY: Performed by: SURGERY

## 2021-02-05 PROCEDURE — 6360000002 HC RX W HCPCS

## 2021-02-05 PROCEDURE — 43239 EGD BIOPSY SINGLE/MULTIPLE: CPT | Performed by: SURGERY

## 2021-02-05 RX ORDER — LIDOCAINE HYDROCHLORIDE 20 MG/ML
INJECTION, SOLUTION INFILTRATION; PERINEURAL PRN
Status: DISCONTINUED | OUTPATIENT
Start: 2021-02-05 | End: 2021-02-05 | Stop reason: SDUPTHER

## 2021-02-05 RX ORDER — PROPOFOL 10 MG/ML
INJECTION, EMULSION INTRAVENOUS PRN
Status: DISCONTINUED | OUTPATIENT
Start: 2021-02-05 | End: 2021-02-05 | Stop reason: SDUPTHER

## 2021-02-05 RX ORDER — SODIUM CHLORIDE, SODIUM LACTATE, POTASSIUM CHLORIDE, CALCIUM CHLORIDE 600; 310; 30; 20 MG/100ML; MG/100ML; MG/100ML; MG/100ML
INJECTION, SOLUTION INTRAVENOUS CONTINUOUS
Status: DISCONTINUED | OUTPATIENT
Start: 2021-02-05 | End: 2021-02-05 | Stop reason: HOSPADM

## 2021-02-05 RX ADMIN — LIDOCAINE HYDROCHLORIDE 100 MG: 20 INJECTION, SOLUTION INFILTRATION; PERINEURAL at 07:59

## 2021-02-05 RX ADMIN — SODIUM CHLORIDE, POTASSIUM CHLORIDE, SODIUM LACTATE AND CALCIUM CHLORIDE: 600; 310; 30; 20 INJECTION, SOLUTION INTRAVENOUS at 06:38

## 2021-02-05 RX ADMIN — SODIUM CHLORIDE, POTASSIUM CHLORIDE, SODIUM LACTATE AND CALCIUM CHLORIDE: 600; 310; 30; 20 INJECTION, SOLUTION INTRAVENOUS at 07:57

## 2021-02-05 RX ADMIN — PROPOFOL 230 MG: 10 INJECTION, EMULSION INTRAVENOUS at 07:59

## 2021-02-05 ASSESSMENT — ENCOUNTER SYMPTOMS
ALLERGIC/IMMUNOLOGIC NEGATIVE: 1
GASTROINTESTINAL NEGATIVE: 1
EYES NEGATIVE: 1
RESPIRATORY NEGATIVE: 1

## 2021-02-05 NOTE — H&P
years: 6.00     Types: Cigarettes     Quit date: 2012     Years since quittin.0    Smokeless tobacco: Never Used   Substance Use Topics    Alcohol use: Yes     Frequency: Monthly or less     Comment: occasionally        Family History   Problem Relation Age of Onset    Heart Disease Maternal Grandfather         Review of Systems  Review of Systems   Constitutional: Negative. HENT: Negative. Eyes: Negative. Respiratory: Negative. Cardiovascular: Negative. Gastrointestinal: Negative. Endocrine: Negative. Genitourinary: Negative. Musculoskeletal: Negative. Skin: Negative. Allergic/Immunologic: Negative. Neurological: Negative. Hematological: Negative. Psychiatric/Behavioral: Negative. Objective:     Patient Vitals for the past 8 hrs:   BP Temp Temp src Pulse Resp SpO2 Height Weight   21 0617 121/67 98 °F (36.7 °C) Temporal 63 16 97 % 5' 2\" (1.575 m) 209 lb (94.8 kg)       Physical Exam  Vitals signs and nursing note reviewed. Constitutional:       Appearance: Normal appearance. She is obese. HENT:      Head: Normocephalic and atraumatic. Right Ear: External ear normal.      Left Ear: External ear normal.      Nose: Nose normal.      Mouth/Throat:      Mouth: Mucous membranes are moist.   Eyes:      Pupils: Pupils are equal, round, and reactive to light. Neck:      Musculoskeletal: Normal range of motion. Cardiovascular:      Rate and Rhythm: Normal rate and regular rhythm. Pulses: Normal pulses. Pulmonary:      Effort: Pulmonary effort is normal.   Abdominal:      General: Bowel sounds are normal. There is no distension. Palpations: Abdomen is soft. Musculoskeletal: Normal range of motion. Skin:     General: Skin is warm and dry. Neurological:      General: No focal deficit present. Mental Status: She is alert and oriented to person, place, and time. Mental status is at baseline.    Psychiatric:         Mood and Affect: Mood normal.         Behavior: Behavior normal.       Data Review: none    Assessment:     Amanda Joyner is a 44 yo female that presents today for an elective EGD    Plan:     -Consent obtained in office  -Reviewed expected pre-operative, operative, and post-operative courses with patient and/or family. -Answered questions to patient's satisfaction.   -Reviewed risks, benefits, alternative to procedure.   -Proceed as scheduled. The patient was counseled at length about the risks of victoriano Covid-19 during their perioperative period and any recovery window from their procedure. The patient was made aware that victoriano Covid-19  may worsen their prognosis for recovering from their procedure  and lend to a higher morbidity and/or mortality risk. All material risks, benefits, and reasonable alternatives including postponing the procedure were discussed. The patient does wish to proceed with the procedure at this time. Electronically signed by WENDY Cotton CNP on 2/5/2021 at 7:35 AM     Agree with above. Pre op EGD. Reviewed procedure. All questions answered. Full code.     [address] [facility]

## 2021-02-05 NOTE — OP NOTE
tolerated the procedure well, and was transferred to the PACU following the procedure in stable condition. IMPRESSION/PLAN:   1. Small hiatal hernia  2. Mild gastritis  3. F/u biopsy results  4.  No anatomic findings to contraindicate planned sleeve gastrectomy     Electronically signed by Elena Sanabria II, MD  on 2/5/2021 at 8:08 AM

## 2021-02-05 NOTE — ANESTHESIA POSTPROCEDURE EVALUATION
Department of Anesthesiology  Postprocedure Note    Patient: Candie Nieves  MRN: [id number]  YOB: 1980  Date of evaluation: 2/5/2021  Time:  8:12 AM     Procedure Summary     Date: 02/05/21 Room / Location: [id number] [place] [address]    Anesthesia Start: 2820 Anesthesia Stop: 1936    Procedure: EGD BIOPSY (N/A ) Diagnosis: (MORBID OBESITY)    Surgeons: Emerita Yin MD Responsible Provider: Elaine Lion MD    Anesthesia Type: MAC ASA Status: 2          Anesthesia Type: MAC    Vanessa Phase I:  10    Vanessa Phase II:  10    Last vitals: Reviewed and per EMR flowsheets.        Anesthesia Post Evaluation    Patient location during evaluation: bedside  Patient participation: complete - patient participated  Level of consciousness: awake and alert  Pain score: 0  Airway patency: patent  Nausea & Vomiting: no nausea and no vomiting  Complications: no  Cardiovascular status: hemodynamically stable  Respiratory status: acceptable, room air, spontaneous ventilation and nonlabored ventilation  Hydration status: euvolemic

## 2021-02-05 NOTE — PROGRESS NOTES
Patient alert and awake, preop questions reviewed and verified, consent and hp completed, handoff by kevin rowland

## 2021-02-05 NOTE — PROGRESS NOTES
1535 Received from OR, placed on monitor. Denies pain, nausea. Call light in reach. 0830 Repositioned in bed, sitting up sipping juice and eating crackers without difficulty. 9937 Family called to review discharge instructions and for  time. 8150 Discharge to car via wheelchair.   Tony Araujo

## 2021-02-11 ENCOUNTER — OFFICE VISIT (OUTPATIENT)
Dept: CARDIOLOGY CLINIC | Age: 41
End: 2021-02-11
Payer: MEDICAID

## 2021-02-11 VITALS
HEIGHT: 62 IN | DIASTOLIC BLOOD PRESSURE: 82 MMHG | BODY MASS INDEX: 38.09 KG/M2 | SYSTOLIC BLOOD PRESSURE: 130 MMHG | HEART RATE: 80 BPM | WEIGHT: 207 LBS

## 2021-02-11 DIAGNOSIS — Z01.818 PREOPERATIVE CLEARANCE: Primary | ICD-10-CM

## 2021-02-11 DIAGNOSIS — R06.09 DYSPNEA ON EXERTION: ICD-10-CM

## 2021-02-11 DIAGNOSIS — Z87.09 HISTORY OF ASTHMA: ICD-10-CM

## 2021-02-11 DIAGNOSIS — E66.01 MORBID OBESITY (HCC): ICD-10-CM

## 2021-02-11 PROCEDURE — 99213 OFFICE O/P EST LOW 20 MIN: CPT | Performed by: INTERNAL MEDICINE

## 2021-02-11 PROCEDURE — 1036F TOBACCO NON-USER: CPT | Performed by: INTERNAL MEDICINE

## 2021-02-11 PROCEDURE — G8417 CALC BMI ABV UP PARAM F/U: HCPCS | Performed by: INTERNAL MEDICINE

## 2021-02-11 PROCEDURE — G8484 FLU IMMUNIZE NO ADMIN: HCPCS | Performed by: INTERNAL MEDICINE

## 2021-02-11 PROCEDURE — G8427 DOCREV CUR MEDS BY ELIG CLIN: HCPCS | Performed by: INTERNAL MEDICINE

## 2021-02-11 NOTE — PROGRESS NOTES
Zakiya Gutiérrez MD                                  CARDIOLOGY  NOTE     Chief Complaint:    Chief Complaint   Patient presents with    Results    Follow-up      Patient presents for follow-up. Re: pre op assessment   She underwent exercise stress test and echocardiogram.  Patient denies any current chest pain shortness of breath or palpitations      EST 1/14/2021     Summary   Overall Impression:   Normal Exercise Stress Test     Functional Capacity: Good Exercise Tolerance. No chest pain noted during  The patient exercised according to the [address] for 7:00 min:s, achieving a   work level of Max. METS: 8.30. The resting heart rate of 80 bpm jason to a   maximal heart rate of 155 bpm. This value represents 85 % of the maximal,   age-predicted heart rate. The resting blood pressure of 148/70 mmHg , jason   to a maximum blood pressure of 168/72 mmHg. The exercise test was stopped   due to Target heart rate achieved. Echocardiogram 1/14/2021       Left ventricular systolic function is normal with an ejection fraction of   55-60%. Doppler evaluation reveals mild aortic, mitral, and tricuspid   regurgitation. Normal pulmonary artery pressure with a RVSP of 21 mmHg. No evidence of pericardial effusion. Recent HPI:     Jen Adamson is a 45y.o. year old female with prior medical history significant for morbid obesity with BMI of 38.85, history of shortness of breath and asthma presents to the clinic for preop assessment for gastric sleeve, weight loss surgery. Patient denies any chest pain. Has chronic shortness of breath with exertion, history of asthma. Patient is a former smoker who quit smoking 6 years ago. EKG shows normal sinus rhythm  No current cardiac symptoms         No current outpatient medications on file. No current facility-administered medications for this visit.         Allergies:     Zofran [ondansetron hcl] and Zoloft [sertraline hcl]    Patient History: Past Medical History:   Diagnosis Date    Abdominal cramps     \"feel like I am constantly getting punched  in my stomach- for 20 yrs\"    ADHD (attention deficit hyperactivity disorder)     Anxiety     Asthma     last flare up 2016    Depression     Dyspnea on exertion 2020    History of asthma 2020    HX OTHER MEDICAL 2016    \"irreg periods for 20 yrs- had period 2016 and 2016 and none since then\"    Insomnia     Morbid obesity ([address] 2020    Nausea     Preop pulmonary/respiratory exam 2020     Past Surgical History:   Procedure Laterality Date    CHOLECYSTECTOMY, LAPAROSCOPIC  14    robotic assisted lap tom    COLONOSCOPY  2016    internal hemorrhoids    ENDOSCOPY, COLON, DIAGNOSTIC      HERNIA REPAIR  13    Hernia Repair x's 2    HYSTERECTOMY      UPPER GASTROINTESTINAL ENDOSCOPY  2021    UPPER GASTROINTESTINAL ENDOSCOPY N/A 2021    EGD BIOPSY performed by Jaqueline Dallas MD at [address] [place] [address]  2016    laprascopic ventral hernia repair with mesh      Family History   Problem Relation Age of Onset    Heart Disease Maternal Grandfather      Social History     Tobacco Use    Smoking status: Former Smoker     Packs/day: 0.50     Years: 12.00     Pack years: 6.00     Types: Cigarettes     Quit date: 2012     Years since quittin.0    Smokeless tobacco: Never Used   Substance Use Topics    Alcohol use: Yes     Frequency: Monthly or less     Comment: occasionally        Review of Systems:     · Constitutional:  No Fever or Weight Loss   · Eyes: No Decreased Vision  · ENT: No Headaches, Hearing Loss or Vertigo  · Cardiovascular: No Chest Pain,  No Shortness of breath, No Palpitations. No Edema   · Respiratory: No cough or wheezing .  No Respiratory distress   · Gastrointestinal: No abdominal pain, appetite loss, blood in stools, constipation, diarrhea or heartburn · Genitourinary: No dysuria, trouble voiding, or hematuria  · Musculoskeletal:  denies any new  joint aches , or pain   · Integumentary: No rash or pruritis  · Neurological: No TIA or stroke symptoms  · Psychiatric: No anxiety or depression  · Endocrine: No malaise, fatigue or temperature intolerance  · Hematologic/Lymphatic: No bleeding problems, blood clots or swollen lymph nodes  · Allergic/Immunologic: No nasal congestion or hives        Objective:      Physical Exam:    /82   Pulse 80   Ht 5' 2\" (1.575 m)   Wt 207 lb (93.9 kg)   LMP 02/29/2016   BMI 37.86 kg/m²   Wt Readings from Last 3 Encounters:   02/11/21 207 lb (93.9 kg)   02/05/21 209 lb (94.8 kg)   01/28/21 209 lb 1.6 oz (94.8 kg)     Body mass index is 37.86 kg/m². Vitals:    02/11/21 1332   BP: 130/82   Pulse: 80        General Appearance and Constitutional: Conversant, Well developed, Well nourished, No acute distress, Non-toxic appearance. HEENT:  Normocephalic, Atraumatic, Bilateral external ears normal, Oropharynx moist, No oral exudates,   Nose normal.   Neck- Normal range of motion, No tenderness, Supple  Eyes:  EOMI, Conjunctiva normal, No discharge. Respiratory:  Normal breath sounds, No respiratory distress, No wheezing, No Rales, No Ronchi. No chest tenderness. Cardiovascular: S1-S2, no added heart sounds, No Mumurs appreciated. No gallops, rubs. No Pedal Edema   GI:  Bowel sounds normal, Soft, No tenderness,  :  No costovertebral angle tenderness   Musculoskeletal:  No gross deformities.  Back- No tenderness  Integument:  Well hydrated, no rash   Lymphatic:  No lymphadenopathy noted   Neurologic:  Alert & oriented x 3, Normal motor function, normal sensory function, no focal deficits noted   Psychiatric:  Speech and behavior appropriate       Medical decision making and Data review:    DATA:    Lab Results   Component Value Date    TROPONINT <0.010 02/02/2015     BNP:  No results found for: PROBNP PT/INR:  No results found for: PTINR  No results found for: LABA1C  No results found for: CHOL, TRIG, HDL, LDLCALC, LDLDIRECT  Lab Results   Component Value Date    WBC 7.5 12/15/2019    HGB 12.5 12/15/2019    HCT 39.2 12/15/2019    MCV 91.6 12/15/2019     12/15/2019     TSH: No results found for: TSH  Lab Results   Component Value Date    AST 18 12/15/2019    ALT 14 12/15/2019    BILITOT 0.3 12/15/2019    ALKPHOS 93 12/15/2019         All labs, medications and tests reviewed by myself including data and history from outside source , patient and available family . 1. Preoperative clearance    2. Dyspnea on exertion    3. Morbid obesity ([address]    4. History of asthma         Impression and Plan:      1. Preop cardiac risk assessment: Patient is morbidly obese and is to undergo gastric sleeve weight loss surgery. Exercise stress test within normal limits with good functional capacity. Echocardiogram within normal limits    Patient is low risk for noncardiac surgery  Cardiology will be available in the perioperative. Please call us with any questions. 2. Shortness of breath  Occasional likely secondary to asthma. Echocardiogram unremarkable   3. morbid obesity with BMI of 38.85. Patient was counseled about weight loss, exercise, low-salt low-fat diet. Patient is anticipated to undergo weight loss surgery  4. Asthma: Stable, on as needed albuterol. 5. Drug abuse, marijuana use. Patient was counseled against drug abuse. Return if symptoms worsen or fail to improve.

## 2021-03-04 ENCOUNTER — OFFICE VISIT (OUTPATIENT)
Dept: BARIATRICS/WEIGHT MGMT | Age: 41
End: 2021-03-04
Payer: MEDICAID

## 2021-03-04 VITALS
HEART RATE: 85 BPM | HEIGHT: 62 IN | OXYGEN SATURATION: 98 % | DIASTOLIC BLOOD PRESSURE: 74 MMHG | BODY MASS INDEX: 38.22 KG/M2 | TEMPERATURE: 96.8 F | WEIGHT: 207.7 LBS | SYSTOLIC BLOOD PRESSURE: 120 MMHG

## 2021-03-04 DIAGNOSIS — Z01.818 PRE-OP TESTING: ICD-10-CM

## 2021-03-04 DIAGNOSIS — E66.01 MORBID OBESITY DUE TO EXCESS CALORIES (HCC): Primary | ICD-10-CM

## 2021-03-04 PROCEDURE — G8427 DOCREV CUR MEDS BY ELIG CLIN: HCPCS | Performed by: NURSE PRACTITIONER

## 2021-03-04 PROCEDURE — 1036F TOBACCO NON-USER: CPT | Performed by: NURSE PRACTITIONER

## 2021-03-04 PROCEDURE — G8417 CALC BMI ABV UP PARAM F/U: HCPCS | Performed by: NURSE PRACTITIONER

## 2021-03-04 PROCEDURE — 99214 OFFICE O/P EST MOD 30 MIN: CPT | Performed by: NURSE PRACTITIONER

## 2021-03-04 PROCEDURE — G8484 FLU IMMUNIZE NO ADMIN: HCPCS | Performed by: NURSE PRACTITIONER

## 2021-03-04 ASSESSMENT — ENCOUNTER SYMPTOMS
PHOTOPHOBIA: 0
BACK PAIN: 0
CHEST TIGHTNESS: 0
SORE THROAT: 0
NAUSEA: 0
APNEA: 0
DIARRHEA: 0
SHORTNESS OF BREATH: 0
COLOR CHANGE: 0
WHEEZING: 0

## 2021-03-04 NOTE — PROGRESS NOTES
Patient dines out to a sit down restaurant 1 times per month. Patient eats fast food meals 0 times per week.       Drinks mostly cherry flavoured water    24 hour recall/food frequency chart:  Breakfast:raison bagel w cream cheese   Snack: none  Lunch: chicken carlos  Snack: none  Dinner: popcorn  Snack: none    Total daily calories:12/1400       Exercises using dumb bells and squats

## 2021-03-04 NOTE — PROGRESS NOTES
BARIATRIC SURGERY OFFICE PROGRESS NOTE    SUBJECTIVE:    Patient presenting today referred from Jc Banks for   Chief Complaint   Patient presents with    Follow-up     6th  visit (in person)   . Vitals:    03/04/21 1356   BP: 120/74   Pulse: 85   Temp: 96.8 °F (36 °C)   SpO2: 98%        BMI: Body mass index is 37.99 kg/m². Weight History: Wt Readings from Last 3 Encounters:   03/04/21 207 lb 11.2 oz (94.2 kg)   02/11/21 207 lb (93.9 kg)   02/05/21 209 lb (94.8 kg)       Jacques Simmonds is a 45 y.o. female presenting in sixth bariatric PRE-OP visit.   -Still needs PT  -Needs labs    Patient dines out to a sit down restaurant 1 times per month. Patient eats fast food meals 0 times per week. Drinks mostly cherry flavoured water    24 hour recall/food frequency chart:  Breakfast:raison bagel w cream cheese   Snack: none  Lunch: chicken carlos  Snack: none  Dinner: popcorn  Snack: none    Total daily calories:12/1400       Exercises using dumb bells and squats     Total weight loss/gain: 1.4 lbs since last visit, and 11.6 lbs since starting program     Thoroughly reviewed the patient's medical history, family history, social history and review of systems with the patient today in the office. Please see medical record for pertinent positives.       Past Medical History:   Diagnosis Date    Abdominal cramps     \"feel like I am constantly getting punched  in my stomach- for 20 yrs\"    ADHD (attention deficit hyperactivity disorder)     Anxiety     Asthma     last flare up 7/2016    Depression     Dyspnea on exertion 12/22/2020    History of asthma 12/22/2020    HX OTHER MEDICAL 07/29/2016    \"irreg periods for 20 yrs- had period 1/2016 and 2/2016 and none since then\"    Insomnia     Morbid obesity ([address] 12/22/2020    Nausea     Preop pulmonary/respiratory exam 12/22/2020        Patient Active Problem List   Diagnosis  Umbilical hernia with obstruction but no gangrene    Periumbilical abdominal pain    Rectal pain    Left upper quadrant pain    History of asthma    Morbid obesity ([address]    Dyspnea on exertion    Preoperative clearance       Past Surgical History:   Procedure Laterality Date    CHOLECYSTECTOMY, LAPAROSCOPIC  02/25/14    robotic assisted lap tom    COLONOSCOPY  08/22/2016    internal hemorrhoids    ENDOSCOPY, COLON, DIAGNOSTIC  2015    HERNIA REPAIR  06-03-13    Hernia Repair x's 2    HYSTERECTOMY      UPPER GASTROINTESTINAL ENDOSCOPY  02/05/2021    UPPER GASTROINTESTINAL ENDOSCOPY N/A 2/5/2021    EGD BIOPSY performed by Waldemar Quintana MD at [address] [place] [address]  08/02/2016    laprascopic ventral hernia repair with mesh        No current outpatient medications on file. No current facility-administered medications for this visit. Allergies   Allergen Reactions    Zofran [Ondansetron Hcl] Hives    Zoloft [Sertraline Hcl]          Review of Systems   Constitutional: Negative for fatigue and fever. HENT: Negative for congestion, dental problem and sore throat. Eyes: Negative for photophobia and visual disturbance. Respiratory: Negative for apnea, chest tightness, shortness of breath and wheezing. Cardiovascular: Negative for chest pain and leg swelling. Gastrointestinal: Negative for diarrhea and nausea. Endocrine: Negative for cold intolerance and heat intolerance. Genitourinary: Negative for difficulty urinating, dysuria, flank pain, frequency and hematuria. Musculoskeletal: Negative for arthralgias and back pain. Skin: Negative for color change, rash and wound. Allergic/Immunologic: Negative for environmental allergies, food allergies and immunocompromised state. Neurological: Negative for dizziness, weakness, light-headedness and numbness. Hematological: Negative for adenopathy. Does not bruise/bleed easily. Psychiatric/Behavioral: Negative for behavioral problems, confusion, sleep disturbance and suicidal ideas. OBJECTIVE:    /74   Pulse 85   Temp 96.8 °F (36 °C)   Ht 5' 2\" (1.575 m)   Wt 207 lb 11.2 oz (94.2 kg)   LMP 02/29/2016   SpO2 98%   BMI 37.99 kg/m²      Physical Exam  Vitals signs reviewed. Constitutional:       Appearance: She is obese. HENT:      Head: Normocephalic and atraumatic. Right Ear: External ear normal.      Left Ear: External ear normal.      Nose: Nose normal.      Mouth/Throat:      Mouth: Mucous membranes are moist.   Eyes:      Extraocular Movements: Extraocular movements intact. Pupils: Pupils are equal, round, and reactive to light. Neck:      Musculoskeletal: Normal range of motion and neck supple. Cardiovascular:      Rate and Rhythm: Normal rate and regular rhythm. Pulses: Normal pulses. Heart sounds: Normal heart sounds. Pulmonary:      Effort: Pulmonary effort is normal.      Breath sounds: Normal breath sounds. Abdominal:      General: Bowel sounds are normal.   Musculoskeletal: Normal range of motion. Skin:     General: Skin is warm and dry. Neurological:      General: No focal deficit present. Mental Status: She is alert and oriented to person, place, and time. Mental status is at baseline. Psychiatric:         Mood and Affect: Mood normal.         Behavior: Behavior normal.         EGD  Duodenum:     Descending: normal    Bulb: normal     Stomach:    Antrum: abnormal: mild gastritis, biopsied     Body: normal    Fundus: abnormal: small hiatal hernia      Esophagus: normal. GEJ at ~ 35 cm     Larynx: normal     The stomach was desufflated and the endoscope was removed. The patient tolerated the procedure well, and was transferred to the PACU following the procedure in stable condition.     IMPRESSION/PLAN:   1. Small hiatal hernia  2. Mild gastritis  3.  F/u biopsy results 4. No anatomic findings to contraindicate planned sleeve gastrectomy     Surgical pathology:    Final Pathologic Diagnosis:   Stomach, antrum, biopsy:   -  Mild chronic gastritis (see comment, see stains report). -  Helicobacter pylori microorganisms are not identified. ASSESSMENT & PLAN:    1. Pre-op testing  - NICOTINE METABOLITES,URINE; Future  - DRUG SCREEN MULTI URINE; Future    2. Morbid obesity due to excess calories ([address]  -Continue to increase level of physical activity.  -Continues to lose weight  -Doing well with Diet  -Still needs psych and PT  -Reviewed EGD and pathology in detail with patient    Discussed in length complying with the dietary recommendations, complying with the preoperative workup including dietary counseling completed, exercise physiologist counseling given number to call, and pre-operative optimization of pulmonologist cleared and cardiologist cleared    No orders of the defined types were placed in this encounter. Orders Placed This Encounter   Procedures    NICOTINE METABOLITES,URINE     Standing Status:   Future     Standing Expiration Date:   3/4/2022    DRUG SCREEN MULTI URINE     Standing Status:   Future     Standing Expiration Date:   3/4/2022       Follow Up:  Return in about 1 month (around 4/4/2021).     Cherl Olszewski

## 2021-03-09 ENCOUNTER — TELEPHONE (OUTPATIENT)
Dept: BARIATRICS/WEIGHT MGMT | Age: 41
End: 2021-03-09

## 2021-03-09 DIAGNOSIS — Z01.818 PREOPERATIVE CLEARANCE: Primary | ICD-10-CM

## 2021-03-10 ENCOUNTER — TELEPHONE (OUTPATIENT)
Dept: BARIATRICS/WEIGHT MGMT | Age: 41
End: 2021-03-10

## 2021-04-06 ENCOUNTER — OFFICE VISIT (OUTPATIENT)
Dept: BARIATRICS/WEIGHT MGMT | Age: 41
End: 2021-04-06
Payer: MEDICAID

## 2021-04-06 VITALS
WEIGHT: 211 LBS | BODY MASS INDEX: 38.83 KG/M2 | DIASTOLIC BLOOD PRESSURE: 80 MMHG | OXYGEN SATURATION: 98 % | HEART RATE: 76 BPM | HEIGHT: 62 IN | SYSTOLIC BLOOD PRESSURE: 120 MMHG

## 2021-04-06 DIAGNOSIS — E66.01 MORBID OBESITY DUE TO EXCESS CALORIES (HCC): Primary | ICD-10-CM

## 2021-04-06 PROCEDURE — G8427 DOCREV CUR MEDS BY ELIG CLIN: HCPCS | Performed by: SURGERY

## 2021-04-06 PROCEDURE — G8417 CALC BMI ABV UP PARAM F/U: HCPCS | Performed by: SURGERY

## 2021-04-06 PROCEDURE — 1036F TOBACCO NON-USER: CPT | Performed by: SURGERY

## 2021-04-06 PROCEDURE — 99213 OFFICE O/P EST LOW 20 MIN: CPT | Performed by: SURGERY

## 2021-04-06 NOTE — PROGRESS NOTES
BARIATRIC SURGERY OFFICE PROGRESS NOTE    SUBJECTIVE:    Patient presenting today referred from Destiny Weston, for   Chief Complaint   Patient presents with    Follow-up     7th wm +4 pounds    . Vitals:    04/06/21 1600   BP: 120/80   Pulse: 76   SpO2: 98%        BMI: Body mass index is 38.59 kg/m². Weight History: Wt Readings from Last 3 Encounters:   04/06/21 211 lb (95.7 kg)   03/04/21 207 lb 11.2 oz (94.2 kg)   02/11/21 207 lb (93.9 kg)        If within 30 days of bariatric surgery date, have you been to the ED: N/A    HPI:Lindsay Zambrano is a 45 y.o. female presenting in seventh bariatric PRE-OP visit. Total weight loss/gain: +4 lbs since last visit, n/a lbs since surgery, -8.6 lbs since starting program    Thoroughly reviewed the patient's medical history, family history, social history and review of systems with the patient today in the office. Please see medical record for pertinent positives. Changes in health since last visit: Denies    Pt tracking calories: yes. Pt exercising: yes. Still waiting on initial labs, PT, and psych clearance.        Past Medical History:   Diagnosis Date    Abdominal cramps     \"feel like I am constantly getting punched  in my stomach- for 20 yrs\"    ADHD (attention deficit hyperactivity disorder)     Anxiety     Asthma     last flare up 7/2016    Depression     Dyspnea on exertion 12/22/2020    History of asthma 12/22/2020    HX OTHER MEDICAL 07/29/2016    \"irreg periods for 20 yrs- had period 1/2016 and 2/2016 and none since then\"    Insomnia     Morbid obesity ([address] 12/22/2020    Nausea     Preop pulmonary/respiratory exam 12/22/2020        Patient Active Problem List   Diagnosis    Umbilical hernia with obstruction but no gangrene    Periumbilical abdominal pain    Rectal pain    Left upper quadrant pain    History of asthma    Morbid obesity ([address]    Dyspnea on exertion       Past Surgical History:   Procedure Laterality Date    CHOLECYSTECTOMY, LAPAROSCOPIC  02/25/14    robotic assisted lap tom    COLONOSCOPY  08/22/2016    internal hemorrhoids    ENDOSCOPY, COLON, DIAGNOSTIC  2015    HERNIA REPAIR  06-03-13    Hernia Repair x's 2    HYSTERECTOMY      UPPER GASTROINTESTINAL ENDOSCOPY  02/05/2021    UPPER GASTROINTESTINAL ENDOSCOPY N/A 2/5/2021    EGD BIOPSY performed by Rula Miller MD at [address] [place] [address]  08/02/2016    laprascopic ventral hernia repair with mesh        No current outpatient medications on file. No current facility-administered medications for this visit. Allergies   Allergen Reactions    Zofran [Ondansetron Hcl] Hives    Zoloft [Sertraline Hcl]          Review of Systems   Constitutional: Positive for fatigue. All other systems reviewed and are negative. OBJECTIVE:    /80 (Site: Left Upper Arm, Position: Sitting, Cuff Size: Large Adult)   Pulse 76   Ht 5' 2\" (1.575 m)   Wt 211 lb (95.7 kg)   LMP 02/29/2016   SpO2 98%   BMI 38.59 kg/m²      Physical Exam  Vitals signs reviewed. Constitutional:       General: She is not in acute distress. Appearance: She is obese. She is not ill-appearing, toxic-appearing or diaphoretic. HENT:      Head: Normocephalic and atraumatic. Right Ear: External ear normal.      Left Ear: External ear normal.      Nose: Nose normal.   Eyes:      General:         Right eye: No discharge. Left eye: No discharge. Neck:      Musculoskeletal: Normal range of motion. Cardiovascular:      Rate and Rhythm: Normal rate. Pulmonary:      Effort: No respiratory distress. Abdominal:      Palpations: Abdomen is soft. Musculoskeletal:         General: No swelling. Skin:     General: Skin is warm. Neurological:      General: No focal deficit present. Mental Status: She is alert. Psychiatric:         Mood and Affect: Mood normal.         ASSESSMENT & PLAN:    1.  Morbid obesity due to excess calories ([facility] [address]  -continue to track calories and exercise  -still needs initial bariatric labs. D/w pt. -needs UDS / tobacco test. D/w pt. -needs PT. D/w pt. -Needs psych consult and clearance. D/w pt.   -Has been cleared by cards and pulm. D/w pt. As of current visit, regarding obesity-related co-morbid conditions:  GURDEEP [] compliant [] no longer using [] resolved per sleep study; hypertension [] medications; hyperlipidemia [] medications; GERD [] medications; DM [] insulin [] non-insulin [] no meds       Patient was encouraged to journal all food intake. Keep calorie level at approximately 1200, per discussion / plan with registered dietician. Protein intake is to be a minimum of 40-50 grams per day. Water drinking was encouraged with a goal of 64oz-128oz daily. Beverages are to be calorie free except for milk. Avoid soda and other carbonated beverages. Continue to increase level of physical activity. I spent 25 minutes with the patient face to face today and over 50% of the office visit today was spent in face to face counseling regarding diet and exercise, in preparation for her planned robotic sleeve gastrectomy. Discussed in length complying with the dietary recommendations, complying with the preoperative workup including dietary counseling , exercise physiologist counseling , and pre-operative optimization of pulmonologist  and cardiologist .    The patient expressed understanding and willingness to comply nicely; all questions and concerns addressed. No orders of the defined types were placed in this encounter. No orders of the defined types were placed in this encounter. Follow Up:  No follow-ups on file.     [address] [place] [address]

## 2021-04-07 ENCOUNTER — HOSPITAL ENCOUNTER (OUTPATIENT)
Age: 41
Setting detail: SPECIMEN
Discharge: HOME | End: 2021-04-07
Payer: MEDICAID

## 2021-04-07 LAB
AMPHETAMINES: NEGATIVE
BARBITURATE SCREEN URINE: NEGATIVE
BENZODIAZEPINE SCREEN, URINE: NEGATIVE
CANNABINOID SCREEN URINE: ABNORMAL
COCAINE METABOLITE: NEGATIVE
OPIATES, URINE: NEGATIVE
OXYCODONE: NEGATIVE
PHENCYCLIDINE, URINE: NEGATIVE

## 2021-04-07 PROCEDURE — 80307 DRUG TEST PRSMV CHEM ANLYZR: CPT

## 2021-04-07 PROCEDURE — G0480 DRUG TEST DEF 1-7 CLASSES: HCPCS

## 2021-04-09 NOTE — RESULT ENCOUNTER NOTE
Pt states she is not on medical marijuana. - and she voiced understanding that she needs to have a neg- drug test to move forward.

## 2021-04-12 LAB
3-OH-COTININE URINE: 206 NG/ML
ANABASINE URINE: <3 NG/ML
COTININE, URINE: 63 NG/ML
NICOTINE AND METABOLITES: 12 NG/ML
NORNICOTINE URINE: <2 NG/ML

## 2021-04-20 ENCOUNTER — HOSPITAL ENCOUNTER (OUTPATIENT)
Dept: PHYSICAL THERAPY | Age: 41
Setting detail: THERAPIES SERIES
Discharge: HOME | End: 2021-04-20
Payer: MEDICAID

## 2021-04-20 PROCEDURE — 97161 PT EVAL LOW COMPLEX 20 MIN: CPT

## 2021-04-20 PROCEDURE — 97110 THERAPEUTIC EXERCISES: CPT

## 2021-04-20 NOTE — PLAN OF CARE
Outpatient Physical Therapy           [place]           [] Phone: [phone]   Fax: [phone]  [facility]           [] Phone: [phone]   Fax: [phone]     To: Referring Practitioner: Dr. Constance Johnson    From: Kmi Sprain, PT, DPT, OCS      Patient: Joaquin Souza        : 1980  Diagnosis: Diagnosis: Bariatric   Treatment Diagnosis: Treatment Diagnosis: Min deconditioning   Date: 2021    Physical Therapy Certification/Re-Certification Form  Dear Dr. Constance Johnson,  The following patient has been evaluated for physical therapy services and for therapy to continue, insurance requires physician review of the treatment plan initially and every 90 days. Please review the attached evaluation and/or summary of the patient's plan of care, and verify that you agree therapy should continue by signing the attached document and sending it back to our office. Assessment:      Pt is 45year old female currently enrolled in weight management program. Pt with min  difficulties completing prolonged activities including standing and walking due to fatigue. Pt demo deficits this date that include min deconditioning with continued activity. Pt will benefit with PT services with return for education on physical activity to safely increase activity to prevent injury and safe weight loss. Patient agrees with established plan of care and assisted in the development of their short term and long term goals. Patient had no adverse reaction with initial treatment and there are no barriers to learning.  Demonstrates no mental or cognitive disorder    Plan of Care/Treatment to date:  [x] Therapeutic Exercise  [] Modalities:  [] Therapeutic Activity     [] Ultrasound  [] Electrical Stimulation  [] Gait Training      [] Cervical Traction [] Lumbar Traction  [] Neuromuscular Re-education    [] Cold/hotpack [] Iontophoresis   [x] Instruction in HEP      [] Vasopneumatic    [] Dry Needling  [] Manual Therapy               [] Aquatic Therapy       Other:          Frequency/Duration:  # Days per week: [x] 1 day # Weeks: [] 1 week [] 5 weeks     [] 2 days   [x] 2 weeks [] 6 weeks     [] 3 days   [] 3 weeks [] 7 weeks     [] 4 days   [] 4 weeks [] 8 weeks         [] 9 weeks [] 10 weeks         [] 11 weeks [] 12 weeks    Rehab Potential/Progress: [] Excellent [x] Good [] Fair  [] Poor     Goals:           Patient Goals   Patient goals : lose additional weight    Long Term Goal: Utilizing group and individual instruction, patient will be educated in physical activity/ exercise concepts including use of basic fitness equipment and developing a personal exercise program        Electronically signed by:  Fidelia Baez PT, DPT, OCS  4/20/2021, 5:15 PM    4/20/2021 5:15 PM         If you have any questions or concerns, please don't hesitate to call.   Thank you for your referral.      Physician Signature:________________________________Date:_________ TIME: _____  By signing above, therapists plan is approved by physician

## 2021-04-20 NOTE — FLOWSHEET NOTE
Outpatient Physical Therapy  [place]           [x] Phone: [phone]   Fax: [phone]  [place]           [] Phone: [phone]   Fax: [phone]        Physical Therapy Daily Treatment Note  Date:  2021    Patient Name:  Salvador Gerber    :  1980  MRN: [id number]  Restrictions/Precautions:    Diagnosis:  Bariatric     Date of Injury/Surgery:   Treatment Diagnosis:    Min deconditioning   Insurance/Certification information: [id number]   Pre-Cert     Referring Physician:   Dr. Robi Mcgovern  Next Doctor Visit:    Plan of care signed (Y/N):  N, sent 21  Outcome Measure: --  Visit# / total visits:  1 /2 per POC  Pain level: 0/10   Goals:        Patient Goals   Patient goals : lose additional weight    Long Term Goal: Utilizing group and individual instruction, patient will be educated in physical activity/ exercise concepts including use of basic fitness equipment and developing a personal exercise program       Summary of Evaluation:    Pt is 45year old female currently enrolled in weight management program. Pt with min  difficulties completing prolonged activities including standing and walking due to fatigue. Pt demo deficits this date that include min deconditioning with continued activity. Pt will benefit with PT services with return for education on physical activity to safely increase activity to prevent injury and safe weight loss. Patient agrees with established plan of care and assisted in the development of their short term and long term goals. Patient had no adverse reaction with initial treatment and there are no barriers to learning. Demonstrates no mental or cognitive disorder        Subjective:  See eval         Any changes in Ambulatory Summary Sheet?   None        Objective:  See eval   COVID screening questions were asked and patient attested that there had been no contact or symptoms        Exercises: (No more than 4 columns)   Exercise/Equipment 21   #1 Date Date           WARM UP                     TABLE                                       STANDING                                                     PROPRIOCEPTION                                    MODALITIES                      Other Therapeutic Activities/Education:  Patient received education on their current pathology and how their condition effects them with their functional activities. Patient understood discussion and questions were answered. Patient understands their activity limitations and understands rational for treatment progression. Educated on safe walking program and benefits to staying active for safe weight loss for overall health. Pt reported understanding. Home Exercise Program:  HO issued, reviewed and discussed with patient. Pt agreed to comply. Manual Treatments:  --      Modalities:  --      Communication with other providers:  POC sent 4/20/21         Assessment:  (Response towards treatment session) (Pain Rating)       Pt is 45year old female currently enrolled in weight management program. Pt with min  difficulties completing prolonged activities including standing and walking due to fatigue. Pt demo deficits this date that include min deconditioning with continued activity. Pt will benefit with PT services with return for education on physical activity to safely increase activity to prevent injury and safe weight loss. Patient agrees with established plan of care and assisted in the development of their short term and long term goals. Patient had no adverse reaction with initial treatment and there are no barriers to learning.  Demonstrates no mental or cognitive disorder      Plan for Next Session:  return for physical activity class to improve endurance and encourage lose weight      Time In / Time Out:   6539-2388        If BWC Please Indicate Time In/Out/Total Time  CPT Code Time in Time out Total Time                                                            Total

## 2021-04-20 NOTE — PROGRESS NOTES
Physical Therapy  Initial Assessment  Date: 2021  Patient Name: Nando Mccall  MRN: [id number]  : 1980     Treatment Diagnosis: Min deconditioning    Restrictions       Subjective   General  Chart Reviewed: Yes  Patient assessed for rehabilitation services?: Yes  Referring Practitioner: Dr. Kern Course  Diagnosis: Bariatric  Subjective  Subjective: States been in the weight management program with getting near the end. States only having a couple other practioners to speak with. No date set for surgery at this time. Denies pains that stops from being active. Denies SOB with prolonged ambulation. Pain Screening  Patient Currently in Pain: No  Vital Signs  Patient Currently in Pain: No    Vision/Hearing  Vision  Vision: Within Functional Limits  Hearing  Hearing: Within functional limits    Orientation  Orientation  Overall Orientation Status: Within Normal Limits    Social/Functional History  Social/Functional History  Lives With: Spouse  Type of Home: House  Home Layout: Multi-level  ADL Assistance: Independent  Homemaking Assistance: Independent  Ambulation Assistance: Independent  Transfer Assistance: Independent  Active : Yes  Mode of Transportation: Car  Occupation: Full time employment  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  Observation: Normal gait entering therapy. No UE use with transfers. No increase in pain. Full squat without aggravation. No aberrant movements with all functional transfers. PROM RLE (degrees)  RLE PROM: WNL  RLE General PROM: No pain with applied overpressure at end ranges. AROM RLE (degrees)  RLE AROM: WNL  RLE General AROM: No pain with applied resistance at end range. PROM LLE (degrees)  LLE PROM: WNL  LLE General PROM: No pain with applied overpressure at end ranges.   AROM LLE (degrees)  LLE AROM : WNL  LLE General AROM: No pain with applied resistance at end range. Spine  Lumbar: Full in all directions without increase in pain. Joint Mobility  Spine: Applied pressure with normal mobility without aggravation. Strength RLE  Strength RLE: WNL  Strength LLE  Strength LLE: WNL  Strength Other  Other: 30 sec sit to stand: 16 reps without UE assistance        Pre-Ambulation   81 bpm   98%                      6MWT:   1243ft without rest break                      Post-Ambulation   101 bpm   97%     Balance  Single Leg Stance R Le  Single Leg Stance L Le  Comments: No increase in pain with testing. Assessment   Conditions Requiring Skilled Therapeutic Intervention  Body structures, Functions, Activity limitations: Decreased endurance  Pt is 45year old female currently enrolled in weight management program. Pt with min  difficulties completing prolonged activities including standing and walking due to fatigue. Pt demo deficits this date that include min deconditioning with continued activity. Pt will benefit with PT services with return for education on physical activity to safely increase activity to prevent injury and safe weight loss. Patient agrees with established plan of care and assisted in the development of their short term and long term goals. Patient had no adverse reaction with initial treatment and there are no barriers to learning. Demonstrates no mental or cognitive disorder  Treatment Diagnosis: Min deconditioning  Prognosis: Good  Decision Making: Low Complexity  Barriers to Learning: None  Activity Tolerance  Activity Tolerance: Patient Tolerated treatment well         Plan   Plan  Times per week: 1  Plan weeks: 2  Specific instructions for Next Treatment: return for physical activity class to improve endurance and encourage lose weight.   Current Treatment Recommendations: Strengthening, Home Exercise Program, Endurance Training    Goals  Patient Goals   Patient goals : lose additional weight    Long Term Goal: Utilizing group and individual instruction, patient will be educated in physical activity/ exercise concepts including use of basic fitness equipment and developing a personal exercise program         Lakesha Walker, PT, DPT, OCS    4/20/2021 5:14 PM

## 2021-04-28 ENCOUNTER — HOSPITAL ENCOUNTER (OUTPATIENT)
Dept: PHYSICAL THERAPY | Age: 41
Setting detail: THERAPIES SERIES
Discharge: HOME | End: 2021-04-28
Payer: MEDICAID

## 2021-04-28 NOTE — FLOWSHEET NOTE
Physical Therapy  Cancellation/No-show Note  Patient Name:  Tomi Norman  :  1980   Date:  2021  Cancelled visits to date: 0  No-shows to date: 1    For today's appointment patient:  []  Cancelled  []  Rescheduled appointment  [x]  No-show     Reason given by patient:  []  Patient ill  []  Conflicting appointment  []  No transportation    []  Conflict with work  []  No reason given  []  Other:     Comments:      Electronically signed by:  Ashley Nicole PT, DPT, OCS    2021 6:30 PM

## 2021-05-11 ENCOUNTER — OFFICE VISIT (OUTPATIENT)
Dept: BARIATRICS/WEIGHT MGMT | Age: 41
End: 2021-05-11
Payer: MEDICAID

## 2021-05-11 VITALS
HEIGHT: 62 IN | SYSTOLIC BLOOD PRESSURE: 122 MMHG | OXYGEN SATURATION: 94 % | BODY MASS INDEX: 37.91 KG/M2 | HEART RATE: 84 BPM | WEIGHT: 206 LBS | DIASTOLIC BLOOD PRESSURE: 86 MMHG

## 2021-05-11 DIAGNOSIS — E66.01 MORBID OBESITY DUE TO EXCESS CALORIES (HCC): ICD-10-CM

## 2021-05-11 DIAGNOSIS — Z01.818 PRE-OPERATIVE CLEARANCE: Primary | ICD-10-CM

## 2021-05-11 PROCEDURE — 1036F TOBACCO NON-USER: CPT | Performed by: SURGERY

## 2021-05-11 PROCEDURE — 99213 OFFICE O/P EST LOW 20 MIN: CPT | Performed by: SURGERY

## 2021-05-11 PROCEDURE — G8427 DOCREV CUR MEDS BY ELIG CLIN: HCPCS | Performed by: SURGERY

## 2021-05-11 PROCEDURE — G8417 CALC BMI ABV UP PARAM F/U: HCPCS | Performed by: SURGERY

## 2021-05-11 RX ORDER — OMEPRAZOLE 10 MG/1
10 CAPSULE, DELAYED RELEASE ORAL DAILY
COMMUNITY
End: 2021-08-23

## 2021-05-11 ASSESSMENT — ENCOUNTER SYMPTOMS: BACK PAIN: 1

## 2021-05-11 NOTE — PROGRESS NOTES
BARIATRIC SURGERY OFFICE PROGRESS NOTE    SUBJECTIVE:    Patient presenting today referred from Janet Greer, for   Chief Complaint   Patient presents with   [facility]     8th surg wm    . Vitals:    05/11/21 1559   BP: 122/86   Pulse: 84   SpO2: 94%        BMI: Body mass index is 37.68 kg/m². Weight History: Wt Readings from Last 3 Encounters:   05/11/21 206 lb (93.4 kg)   04/06/21 211 lb (95.7 kg)   03/04/21 207 lb 11.2 oz (94.2 kg)        If within 30 days of bariatric surgery date, have you been to the ED: N/A      HPI:Lindsay Zambrano is a 45 y.o. female presenting in eighth bariatric PRE-OP visit. Total weight loss/gain: -5 lbs since last visit, n/a lbs since surgery, -13.6 lbs since starting program    Thoroughly reviewed the patient's medical history, family history, social history and review of systems with the patient today in the office. Please see medical record for pertinent positives. Changes in health since last visit: No changes. Was cleared by Psych. Pt tracking calories: Yes. Pt exercising: Yes.        Past Medical History:   Diagnosis Date    Abdominal cramps     \"feel like I am constantly getting punched  in my stomach- for 20 yrs\"    ADHD (attention deficit hyperactivity disorder)     Anxiety     Asthma     last flare up 7/2016    Depression     Dyspnea on exertion 12/22/2020    History of asthma 12/22/2020    HX OTHER MEDICAL 07/29/2016    \"irreg periods for 20 yrs- had period 1/2016 and 2/2016 and none since then\"    Insomnia     Morbid obesity ([address] 12/22/2020    Nausea     Preop pulmonary/respiratory exam 12/22/2020        Patient Active Problem List   Diagnosis    Umbilical hernia with obstruction but no gangrene    Periumbilical abdominal pain    Rectal pain    Left upper quadrant pain    History of asthma    Morbid obesity ([address]    Dyspnea on exertion       Past Surgical History:   Procedure Laterality Date    CHOLECYSTECTOMY, LAPAROSCOPIC  02/25/14    robotic assisted lap tom    COLONOSCOPY  08/22/2016    internal hemorrhoids    ENDOSCOPY, COLON, DIAGNOSTIC  2015    HERNIA REPAIR  06-03-13    Hernia Repair x's 2    HYSTERECTOMY      UPPER GASTROINTESTINAL ENDOSCOPY  02/05/2021    UPPER GASTROINTESTINAL ENDOSCOPY N/A 2/5/2021    EGD BIOPSY performed by Shelley Gutiérrez MD at [address] [place] [address]  08/02/2016    laprascopic ventral hernia repair with mesh        Current Outpatient Medications   Medication Sig Dispense Refill    omeprazole (PRILOSEC) 10 MG delayed release capsule Take 10 mg by mouth daily       No current facility-administered medications for this visit. Allergies   Allergen Reactions    Zofran [Ondansetron Hcl] Hives    Zoloft [Sertraline Hcl]          Review of Systems   Constitutional: Positive for fatigue. Musculoskeletal: Positive for arthralgias and back pain. All other systems reviewed and are negative. OBJECTIVE:    /86 (Site: Left Upper Arm, Position: Sitting, Cuff Size: Large Adult)   Pulse 84   Ht 5' 2\" (1.575 m)   Wt 206 lb (93.4 kg)   LMP 02/29/2016   SpO2 94%   BMI 37.68 kg/m²      Physical Exam  Vitals signs reviewed. Constitutional:       General: She is not in acute distress. Appearance: She is obese. She is not ill-appearing, toxic-appearing or diaphoretic. HENT:      Head: Normocephalic and atraumatic. Nose: Nose normal.   Eyes:      General:         Right eye: No discharge. Left eye: No discharge. Extraocular Movements: Extraocular movements intact. Neck:      Musculoskeletal: Normal range of motion. Cardiovascular:      Rate and Rhythm: Normal rate. Pulmonary:      Effort: No respiratory distress. Abdominal:      General: There is no distension. Tenderness: There is no guarding. Musculoskeletal:         General: No swelling. Skin:     General: Skin is warm.    Neurological: General: No focal deficit present. Mental Status: She is alert. ASSESSMENT & PLAN:    1. Pre-operative clearance  Reviewed UDS and urine nicotine. Both positive. Needs tobacco/nicotine and marijuana cessation. Repeat tests ordered. - Nicotine Metabolites, Urine; Future  - Urine Drug Screen; Future    2. Morbid obesity due to excess calories ([address]  -Doing well. Down 5 lbs since last visit and 13.6 lbs since starting.   -F/u in one month. -Reviewed psych clearance. Cleared for procedure. At this point just waiting on UDS/urine nicotine and initial bariatric labs. -Call with any questions, concerns, or issues whatsoever. As of current visit, regarding obesity-related co-morbid conditions:  GURDEEP [] compliant [] no longer using [] resolved per sleep study; hypertension [] medications; hyperlipidemia [] medications; GERD [] medications; DM [] insulin [] non-insulin [] no meds       Patient was encouraged to journal all food intake. Keep calorie level at approximately 1200, per discussion / plan with registered dietician. Protein intake is to be a minimum of 40-50 grams per day. Water drinking was encouraged with a goal of 64oz-128oz daily. Beverages are to be calorie free except for milk. Avoid soda and other carbonated beverages. Continue to increase level of physical activity. I spent 25 minutes with the patient face to face today and over 50% of the office visit today was spent in face to face counseling regarding diet and exercise, in preparation for her planned robotic sleeve gastrectomy. Discussed in length complying with the dietary recommendations, complying with the preoperative workup including dietary counseling , exercise physiologist counseling , and pre-operative optimization of pulmonologist  and cardiologist .    The patient expressed understanding and willingness to comply nicely; all questions and concerns addressed.     No orders of the defined

## 2021-05-21 ENCOUNTER — APPOINTMENT (OUTPATIENT)
Dept: GENERAL RADIOLOGY | Age: 41
End: 2021-05-21
Payer: MEDICAID

## 2021-05-21 ENCOUNTER — HOSPITAL ENCOUNTER (EMERGENCY)
Age: 41
Discharge: HOME | End: 2021-05-21
Payer: MEDICAID

## 2021-05-21 VITALS
TEMPERATURE: 98 F | OXYGEN SATURATION: 99 % | RESPIRATION RATE: 19 BRPM | DIASTOLIC BLOOD PRESSURE: 67 MMHG | SYSTOLIC BLOOD PRESSURE: 127 MMHG | HEART RATE: 79 BPM

## 2021-05-21 DIAGNOSIS — S60.222A CONTUSION OF LEFT HAND, INITIAL ENCOUNTER: Primary | ICD-10-CM

## 2021-05-21 PROCEDURE — 99284 EMERGENCY DEPT VISIT MOD MDM: CPT

## 2021-05-21 PROCEDURE — 73130 X-RAY EXAM OF HAND: CPT

## 2021-05-21 PROCEDURE — 6370000000 HC RX 637 (ALT 250 FOR IP): Performed by: PHYSICIAN ASSISTANT

## 2021-05-21 RX ORDER — IBUPROFEN 800 MG/1
800 TABLET ORAL ONCE
Status: COMPLETED | OUTPATIENT
Start: 2021-05-21 | End: 2021-05-21

## 2021-05-21 RX ORDER — IBUPROFEN 800 MG/1
800 TABLET ORAL EVERY 6 HOURS PRN
Qty: 30 TABLET | Refills: 0 | Status: SHIPPED | OUTPATIENT
Start: 2021-05-21 | End: 2022-01-05

## 2021-05-21 RX ADMIN — IBUPROFEN 800 MG: 800 TABLET, FILM COATED ORAL at 01:58

## 2021-05-21 ASSESSMENT — PAIN SCALES - GENERAL: PAINLEVEL_OUTOF10: 9

## 2021-05-21 NOTE — ED PROVIDER NOTES
eMERGENCY dEPARTMENT eNCOUnter        [facility]    Chief Complaint   Patient presents with    Hand Injury     Left hand swelling       HPI    Lv Bhatia is a 45 y.o. female who presents with hand pain. Onset was around 10pm.  Context was patient punched a door. Pain gradually developed since then. Localized to dorsal left hand. Characterized as aching. Aggravating factors:  Palpation, movement. Alleviating factors: None. Severity is a 9 on a scale of 0-10. Patient reports associated swelling. Patient is left-hand dominant. REVIEW OF SYSTEMS    See HPI for further details. Review of systems otherwise negative. Musculoskeletal:  + hand pain, + swelling. Integument:  Denies abrasions/lacerations. Neurologic:  Denies numbness/tingling.     PAST MEDICAL HISTORY    Past Medical History:   Diagnosis Date    Abdominal cramps     \"feel like I am constantly getting punched  in my stomach- for 20 yrs\"    ADHD (attention deficit hyperactivity disorder)     Anxiety     Asthma     last flare up 7/2016    Depression     Dyspnea on exertion 12/22/2020    History of asthma 12/22/2020    HX OTHER MEDICAL 07/29/2016    \"irreg periods for 20 yrs- had period 1/2016 and 2/2016 and none since then\"    Insomnia     Morbid obesity ([facility] [address] [facility] 12/22/2020    Nausea     Preop pulmonary/respiratory exam 12/22/2020       SURGICAL HISTORY    Past Surgical History:   Procedure Laterality Date    CHOLECYSTECTOMY, LAPAROSCOPIC  02/25/14    robotic assisted lap tom    COLONOSCOPY  08/22/2016    internal hemorrhoids    ENDOSCOPY, COLON, DIAGNOSTIC  2015    HERNIA REPAIR  06-03-13    Hernia Repair x's 2    HYSTERECTOMY      UPPER GASTROINTESTINAL ENDOSCOPY  02/05/2021    UPPER GASTROINTESTINAL ENDOSCOPY N/A 2/5/2021    EGD BIOPSY performed by Vesta Terrell MD at [address] [place] [address]  08/02/2016    laprascopic ventral hernia repair with mesh        CURRENT MEDICATIONS    Current Outpatient Rx   Medication Sig Dispense Refill    ibuprofen (ADVIL;MOTRIN) 800 MG tablet Take 1 tablet by mouth every 6 hours as needed for Pain 30 tablet 0    omeprazole (PRILOSEC) 10 MG delayed release capsule Take 10 mg by mouth daily         ALLERGIES    Allergies   Allergen Reactions    Zofran [Ondansetron Hcl] Hives    Zoloft [Sertraline Hcl]        FAMILY HISTORY    Family History   Problem Relation Age of Onset    Heart Disease Maternal Grandfather        SOCIAL HISTORY    Social History     Socioeconomic History    Marital status: Single     Spouse name: Not on file    Number of children: Not on file    Years of education: Not on file    Highest education level: Not on file   Occupational History    Not on file   Tobacco Use    Smoking status: Former Smoker     Packs/day: 0.50     Years: 12.00     Pack years: 6.00     Types: Cigarettes     Quit date: 2012     Years since quittin.3    Smokeless tobacco: Never Used   Vaping Use    Vaping Use: Never used   Substance and Sexual Activity    Alcohol use: Yes     Comment: occasionally    Drug use: Yes     Frequency: 7.0 times per week     Types: Marijuana    Sexual activity: Yes     Partners: Female     Birth control/protection: Surgical   Other Topics Concern    Not on file   Social History Narrative    Not on file     Social Determinants of Health     Financial Resource Strain:     Difficulty of Paying Living Expenses:    Food Insecurity:     Worried About Running Out of Food in the Last Year:     Ran Out of Food in the Last Year:    Transportation Needs:     Lack of Transportation (Medical):      Lack of Transportation (Non-Medical):    Physical Activity:     Days of Exercise per Week:     Minutes of Exercise per Session:    Stress:     Feeling of Stress :    Social Connections:     Frequency of Communication with Friends and Family:     Frequency of Social Gatherings with Friends and Family:     Attends Religion Services:  Active Member of Clubs or Organizations:     Attends Club or Organization Meetings:     Marital Status:    Intimate Partner Violence:     Fear of Current or Ex-Partner:     Emotionally Abused:     Physically Abused:     Sexually Abused:        PHYSICAL EXAM    VITAL SIGNS: /67   Pulse 79   Temp 98 °F (36.7 °C) (Oral)   Resp 19   LMP 02/29/2016   SpO2 99%   Constitutional:  Well developed, well nourished, no acute distress, non-toxic appearance   HENT:  NC/AT. Ears, nose, mouth normal.  Respiratory:  Normal respiratory effort. Musculoskeletal:  Soft tissue swelling to the dorsal left hand along base of 4th-5th fingers with ttp. No gross deformity. Able to flex and extend all fingers but increased pain with 4th-5th. Cap refill brisk. Radial and ulnar pulses intact. Integument:  Well hydrated. No bruising, no lacerations or abrasions. Neurologic:  Sensation intact. RADIOLOGY/PROCEDURES    XR HAND LEFT (MIN 3 VIEWS)    Result Date: 5/21/2021  EXAMINATION: THREE XRAY VIEWS OF THE LEFT HAND 5/21/2021 12:54 am COMPARISON: None. HISTORY: ORDERING SYSTEM PROVIDED HISTORY: pain and swelling TECHNOLOGIST PROVIDED HISTORY: Reason for exam:->pain and swelling Reason for Exam: pain and swelling Acuity: Acute Type of Exam: Initial Mechanism of Injury: pain and swelling Relevant Medical/Surgical History: pain and swelling Left hand pain FINDINGS: There is no evidence of acute fracture. There is normal alignment. No acute joint abnormality. No focal osseous lesion. No focal soft tissue abnormality. No acute osseous abnormality. ED COURSE & MEDICAL DECISION MAKING    Pertinent Labs & Imaging studies reviewed. (See chart for details)  -  Patient seen and evaluated in the emergency department. -  Triage and nursing notes reviewed and incorporated. -  Old chart records reviewed and incorporated. -  Supervising physician was Dr. Joaquin Rodriguez. Patient was seen independently.   -  Differential diagnosis includes: fracture, dislocation, contusion, tendinitis, tenosynovitis, carpal tunnel syndrome, cellulitis, neuropathy, and others  -  Work-up included:  XR  -  Patient treated with Ibuprofen in the ED.  -  Results discussed with patient. XR is negative. ACE wrap applied. RICE. Rx Ibuprofen. FU with PCP or return for repeat imaging if pain persists/worsens. She is agreeable with plan of care and disposition.  -  Patient dc home. In light of current events, I did utilize appropriate PPE (including N95 and surgical face mask, safety glasses, and gloves, as recommended by the health facility/national standard best practice, during my bedside interactions with the patient. FINAL IMPRESSION    1.  Contusion of left hand, initial encounter              Aleisha Pinzon PA-C  05/21/21 2027

## 2021-05-25 ENCOUNTER — HOSPITAL ENCOUNTER (OUTPATIENT)
Age: 41
Discharge: HOME | End: 2021-05-25
Payer: MEDICAID

## 2021-05-25 LAB
ALBUMIN SERPL-MCNC: 4.2 GM/DL (ref 3.4–5)
ALP BLD-CCNC: 78 IU/L (ref 40–128)
ALT SERPL-CCNC: 7 U/L (ref 10–40)
AMPHETAMINES: NEGATIVE
ANION GAP SERPL CALCULATED.3IONS-SCNC: 10 MMOL/L (ref 4–16)
AST SERPL-CCNC: 13 IU/L (ref 15–37)
BARBITURATE SCREEN URINE: NEGATIVE
BENZODIAZEPINE SCREEN, URINE: ABNORMAL
BILIRUB SERPL-MCNC: 0.3 MG/DL (ref 0–1)
BUN BLDV-MCNC: 9 MG/DL (ref 6–23)
CALCIUM SERPL-MCNC: 9.2 MG/DL (ref 8.3–10.6)
CANNABINOID SCREEN URINE: ABNORMAL
CHLORIDE BLD-SCNC: 105 MMOL/L (ref 99–110)
CHOLESTEROL: 185 MG/DL
CO2: 24 MMOL/L (ref 21–32)
COCAINE METABOLITE: NEGATIVE
CREAT SERPL-MCNC: 0.7 MG/DL (ref 0.6–1.1)
DIFFERENTIAL TYPE: ABNORMAL
ESTIMATED AVERAGE GLUCOSE: 131 MG/DL
FOLATE: 5.5 NG/ML (ref 3.1–17.5)
GFR AFRICAN AMERICAN: >60 ML/MIN/1.73M2
GFR NON-AFRICAN AMERICAN: >60 ML/MIN/1.73M2
GLUCOSE BLD-MCNC: 91 MG/DL (ref 70–99)
HBA1C MFR BLD: 6.2 % (ref 4.2–6.3)
HCT VFR BLD CALC: 37.3 % (ref 37–47)
HDLC SERPL-MCNC: 45 MG/DL
HEMOGLOBIN: 12 GM/DL (ref 12.5–16)
IRON: 36 UG/DL (ref 37–145)
LDL CHOLESTEROL DIRECT: 128 MG/DL
LYMPHOCYTES ABSOLUTE: 4.3 K/CU MM
LYMPHOCYTES RELATIVE PERCENT: 54 % (ref 24–44)
MCH RBC QN AUTO: 29.8 PG (ref 27–31)
MCHC RBC AUTO-ENTMCNC: 32.2 % (ref 32–36)
MCV RBC AUTO: 92.6 FL (ref 78–100)
MONOCYTES ABSOLUTE: 0.6 K/CU MM
MONOCYTES RELATIVE PERCENT: 7 % (ref 0–4)
OPIATES, URINE: NEGATIVE
OXYCODONE: NEGATIVE
PCT TRANSFERRIN: 13 % (ref 10–44)
PDW BLD-RTO: 13.5 % (ref 11.7–14.9)
PHENCYCLIDINE, URINE: NEGATIVE
PLATELET # BLD: 304 K/CU MM (ref 140–440)
PMV BLD AUTO: 9.2 FL (ref 7.5–11.1)
POTASSIUM SERPL-SCNC: 4.8 MMOL/L (ref 3.5–5.1)
RBC # BLD: 4.03 M/CU MM (ref 4.2–5.4)
SEGMENTED NEUTROPHILS ABSOLUTE COUNT: 3.1 K/CU MM
SEGMENTED NEUTROPHILS RELATIVE PERCENT: 39 % (ref 36–66)
SODIUM BLD-SCNC: 139 MMOL/L (ref 135–145)
TOTAL IRON BINDING CAPACITY: 281 UG/DL (ref 250–450)
TOTAL PROTEIN: 7.2 GM/DL (ref 6.4–8.2)
TRIGL SERPL-MCNC: 92 MG/DL
UNSATURATED IRON BINDING CAPACITY: 245 UG/DL (ref 110–370)
VITAMIN B-12: 185.2 PG/ML (ref 211–911)
WBC # BLD: 8 K/CU MM (ref 4–10.5)

## 2021-05-25 PROCEDURE — G0480 DRUG TEST DEF 1-7 CLASSES: HCPCS

## 2021-05-25 PROCEDURE — 80053 COMPREHEN METABOLIC PANEL: CPT

## 2021-05-25 PROCEDURE — 83550 IRON BINDING TEST: CPT

## 2021-05-25 PROCEDURE — 83036 HEMOGLOBIN GLYCOSYLATED A1C: CPT

## 2021-05-25 PROCEDURE — 83721 ASSAY OF BLOOD LIPOPROTEIN: CPT

## 2021-05-25 PROCEDURE — 85025 COMPLETE CBC W/AUTO DIFF WBC: CPT

## 2021-05-25 PROCEDURE — 36415 COLL VENOUS BLD VENIPUNCTURE: CPT

## 2021-05-25 PROCEDURE — 83540 ASSAY OF IRON: CPT

## 2021-05-25 PROCEDURE — 82746 ASSAY OF FOLIC ACID SERUM: CPT

## 2021-05-25 PROCEDURE — 80061 LIPID PANEL: CPT

## 2021-05-25 PROCEDURE — 80307 DRUG TEST PRSMV CHEM ANLYZR: CPT

## 2021-05-25 PROCEDURE — 82607 VITAMIN B-12: CPT

## 2021-05-29 LAB
3-OH-COTININE URINE: 74 NG/ML
ANABASINE URINE: <3 NG/ML
COTININE, URINE: 8 NG/ML
NICOTINE AND METABOLITES: 4 NG/ML
NORNICOTINE URINE: <2 NG/ML

## 2021-06-08 ENCOUNTER — OFFICE VISIT (OUTPATIENT)
Dept: BARIATRICS/WEIGHT MGMT | Age: 41
End: 2021-06-08
Payer: MEDICAID

## 2021-06-08 VITALS
DIASTOLIC BLOOD PRESSURE: 70 MMHG | TEMPERATURE: 98.2 F | WEIGHT: 203.8 LBS | OXYGEN SATURATION: 97 % | BODY MASS INDEX: 37.5 KG/M2 | HEART RATE: 74 BPM | SYSTOLIC BLOOD PRESSURE: 102 MMHG | HEIGHT: 62 IN

## 2021-06-08 DIAGNOSIS — Z01.818 PREOPERATIVE CLEARANCE: Primary | ICD-10-CM

## 2021-06-08 PROCEDURE — 1036F TOBACCO NON-USER: CPT | Performed by: SURGERY

## 2021-06-08 PROCEDURE — G8417 CALC BMI ABV UP PARAM F/U: HCPCS | Performed by: SURGERY

## 2021-06-08 PROCEDURE — 99213 OFFICE O/P EST LOW 20 MIN: CPT | Performed by: SURGERY

## 2021-06-08 PROCEDURE — G8427 DOCREV CUR MEDS BY ELIG CLIN: HCPCS | Performed by: SURGERY

## 2021-06-08 ASSESSMENT — ENCOUNTER SYMPTOMS
ALLERGIC/IMMUNOLOGIC NEGATIVE: 1
EYES NEGATIVE: 1
RESPIRATORY NEGATIVE: 1
GASTROINTESTINAL NEGATIVE: 1

## 2021-06-08 NOTE — PROGRESS NOTES
BARIATRIC SURGERY OFFICE PROGRESS NOTE    SUBJECTIVE:    Patient presenting today referred from Titi Aparicio for   Chief Complaint   Patient presents with    Follow-up     9th [facility] surg visit Lab results in   . Vitals:    06/08/21 1547   BP: 102/70   Pulse: 74   Temp: 98.2 °F (36.8 °C)   SpO2: 97%        BMI: Body mass index is 37.28 kg/m². Weight History: Wt Readings from Last 3 Encounters:   06/08/21 203 lb 12.8 oz (92.4 kg)   05/11/21 206 lb (93.4 kg)   04/06/21 211 lb (95.7 kg)        If within 30 days of bariatric surgery date, have you been to the ED: N/A      HPI:Lindsay Zambrano is a 45 y.o. female presenting in ninth bariatric PRE-OP  visit. Total weight loss/gain: -2.2 lbs since last visit, n/a lbs since surgery, -15.8 lbs since starting program    Thoroughly reviewed the patient's medical history, family history, social history and review of systems with the patient today in the office. Please see medical record for pertinent positives. Changes in health since last visit: repeated UDS and urine nicotine test. Still positive. Pt tracking calories: yes. Pt exercising: yes.       Past Medical History:   Diagnosis Date    Abdominal cramps     \"feel like I am constantly getting punched  in my stomach- for 20 yrs\"    ADHD (attention deficit hyperactivity disorder)     Anxiety     Asthma     last flare up 7/2016    Depression     Dyspnea on exertion 12/22/2020    History of asthma 12/22/2020    HX OTHER MEDICAL 07/29/2016    \"irreg periods for 20 yrs- had period 1/2016 and 2/2016 and none since then\"    Insomnia     Morbid obesity ([address] 12/22/2020    Nausea     Preop pulmonary/respiratory exam 12/22/2020        Patient Active Problem List   Diagnosis    Umbilical hernia with obstruction but no gangrene    Periumbilical abdominal pain    Rectal pain    Left upper quadrant pain    History of asthma    Morbid obesity ([address]    Dyspnea on exertion       Past Surgical History:   Procedure Laterality Date    CHOLECYSTECTOMY, LAPAROSCOPIC  02/25/14    robotic assisted lap tom    COLONOSCOPY  08/22/2016    internal hemorrhoids    ENDOSCOPY, COLON, DIAGNOSTIC  2015    HERNIA REPAIR  06-03-13    Hernia Repair x's 2    HYSTERECTOMY      UPPER GASTROINTESTINAL ENDOSCOPY  02/05/2021    UPPER GASTROINTESTINAL ENDOSCOPY N/A 2/5/2021    EGD BIOPSY performed by Gonzalo Soto MD at [address] [place] [address]  08/02/2016    laprascopic ventral hernia repair with mesh        Current Outpatient Medications   Medication Sig Dispense Refill    ibuprofen (ADVIL;MOTRIN) 800 MG tablet Take 1 tablet by mouth every 6 hours as needed for Pain 30 tablet 0    omeprazole (PRILOSEC) 10 MG delayed release capsule Take 10 mg by mouth daily       No current facility-administered medications for this visit. Allergies   Allergen Reactions    Zofran [Ondansetron Hcl] Hives    Zoloft [Sertraline Hcl]          Review of Systems   Constitutional: Positive for fatigue. HENT: Negative. Eyes: Negative. Respiratory: Negative. Cardiovascular: Negative. Gastrointestinal: Negative. Endocrine: Negative. Genitourinary: Negative. Musculoskeletal: Positive for arthralgias. Skin: Negative. Allergic/Immunologic: Negative. Neurological: Negative. Hematological: Negative. Psychiatric/Behavioral: Negative. All other systems reviewed and are negative. OBJECTIVE:    /70   Pulse 74   Temp 98.2 °F (36.8 °C)   Ht 5' 2\" (1.575 m)   Wt 203 lb 12.8 oz (92.4 kg)   LMP 02/29/2016   SpO2 97%   BMI 37.28 kg/m²      Physical Exam    ASSESSMENT & PLAN:    1. Preoperative clearance  -UDS and urine nicotine still positive. Repeat in one month. - DRUG SCREEN MULTI URINE; Future  - Nicotine Metabolites, Urine; Future  -Otherwise doing well and can proceed with surgery once preop testing done.    -F/u in one month  -Call with any questions, concerns, or issues whatsoever. As of current visit, regarding obesity-related co-morbid conditions:  GURDEEP [] compliant [] no longer using [] resolved per sleep study; hypertension [] medications; hyperlipidemia [] medications; GERD [] medications; DM [] insulin [] non-insulin [] no meds       Patient was encouraged to journal all food intake. Keep calorie level at approximately 1200, per discussion / plan with registered dietician. Protein intake is to be a minimum of 40-50 grams per day. Water drinking was encouraged with a goal of 64oz-128oz daily. Beverages are to be calorie free except for milk. Avoid soda and other carbonated beverages. Continue to increase level of physical activity. I spent 25 minutes with the patient face to face today and over 50% of the office visit today was spent in face to face counseling regarding diet and exercise, in preparation for her planned robotic sleeve gastrectomy. Discussed in length complying with the dietary recommendations, complying with the preoperative workup including dietary counseling , exercise physiologist counseling , and pre-operative optimization of pulmonologist  and cardiologist .    The patient expressed understanding and willingness to comply nicely; all questions and concerns addressed. No orders of the defined types were placed in this encounter. Orders Placed This Encounter   Procedures    DRUG SCREEN MULTI URINE     Standing Status:   Future     Standing Expiration Date:   6/8/2022    Nicotine Metabolites, Urine     Standing Status:   Future     Standing Expiration Date:   6/8/2022       Follow Up:  No follow-ups on file.     Santhosh Calvin MD

## 2021-07-13 ENCOUNTER — HOSPITAL ENCOUNTER (OUTPATIENT)
Age: 41
Setting detail: SPECIMEN
Discharge: HOME | End: 2021-07-13
Payer: MEDICAID

## 2021-07-13 PROCEDURE — G0480 DRUG TEST DEF 1-7 CLASSES: HCPCS

## 2021-07-13 PROCEDURE — 80307 DRUG TEST PRSMV CHEM ANLYZR: CPT

## 2021-07-17 LAB
3-OH-COTININE URINE: <50 NG/ML
ANABASINE URINE: <3 NG/ML
COTININE, URINE: <5 NG/ML
NICOTINE AND METABOLITES: <2 NG/ML
NORNICOTINE URINE: <2 NG/ML

## 2021-08-24 PROBLEM — R10.9 ABDOMINAL PAIN: Status: ACTIVE | Noted: 2021-08-24

## 2021-10-21 ENCOUNTER — HOSPITAL ENCOUNTER (EMERGENCY)
Age: 41
Discharge: HOME | End: 2021-10-21
Payer: MEDICAID

## 2021-10-21 ENCOUNTER — APPOINTMENT (OUTPATIENT)
Dept: GENERAL RADIOLOGY | Age: 41
End: 2021-10-21
Payer: MEDICAID

## 2021-10-21 VITALS
DIASTOLIC BLOOD PRESSURE: 74 MMHG | WEIGHT: 201 LBS | HEART RATE: 72 BPM | OXYGEN SATURATION: 99 % | BODY MASS INDEX: 37.95 KG/M2 | HEIGHT: 61 IN | RESPIRATION RATE: 16 BRPM | TEMPERATURE: 99 F | SYSTOLIC BLOOD PRESSURE: 126 MMHG

## 2021-10-21 DIAGNOSIS — M25.511 ACUTE PAIN OF RIGHT SHOULDER: Primary | ICD-10-CM

## 2021-10-21 DIAGNOSIS — M77.8 TENDINITIS OF RIGHT SHOULDER: ICD-10-CM

## 2021-10-21 PROCEDURE — 99283 EMERGENCY DEPT VISIT LOW MDM: CPT

## 2021-10-21 PROCEDURE — 73030 X-RAY EXAM OF SHOULDER: CPT

## 2021-10-21 RX ORDER — METHOCARBAMOL 500 MG/1
500 TABLET, FILM COATED ORAL 3 TIMES DAILY
Qty: 15 TABLET | Refills: 0 | Status: SHIPPED | OUTPATIENT
Start: 2021-10-21 | End: 2021-10-26

## 2021-10-21 RX ORDER — METHYLPREDNISOLONE 4 MG/1
TABLET ORAL
Qty: 21 TABLET | Refills: 0 | Status: SHIPPED | OUTPATIENT
Start: 2021-10-21

## 2021-10-21 ASSESSMENT — PAIN DESCRIPTION - PAIN TYPE: TYPE: ACUTE PAIN

## 2021-10-21 ASSESSMENT — PAIN DESCRIPTION - FREQUENCY: FREQUENCY: CONTINUOUS

## 2021-10-21 ASSESSMENT — PAIN DESCRIPTION - LOCATION: LOCATION: ARM;SHOULDER

## 2021-10-21 NOTE — ED PROVIDER NOTES
eMERGENCY dEPARTMENT eNCOUnter         [address] [facility] EMERGENCY DEPARTMENT    PCP: [id number]    Chief Complaint   Patient presents with    Shoulder Pain     right shoulder pain unable to lift arm       HPI    Tae Fofana is a 37 y.o. female who presents with right shoulder pain. Onset is prior to arrival, x2 days ago. Context is patient denies any history of trauma or injury. 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 Began having anterolateral right shoulder pain and stiffness having difficulty raising arm above her head. No ipsilateral elbow, wrist or hand pain. Is having some \"tingling\" into her right hand intermittently with decreased  strength secondary to shoulder pain but no associated neck pain. No history of IV drug use, no diabetes. States that PT at her nursing facility attempted massage and range of motion but had worsening 10/10 pain which prompted her to come to the ED for evaluation. Has tried over-the-counter oral analgesics without relief. No previous history of right shoulder fracture surgery. No anticoagulation use. No chest pain shortness of breath dizziness or lightheadedness. REVIEW OF SYSTEMS    General: Denies fever or chills  Cardiac: Denies chest pain  Pulmonary: Denies shortness of breath, wheezes, or difficulty breathing  GI: Denies abdominal pain, vomiting, or diarrhea  : No dysuria or hematuria    Denies any other muscles skeletal injuries, including chest wall and back.     All other review of systems are negative  See HPI and nursing notes for additional information     PAST MEDICAL & SURGICAL HISTORY    Past Medical History:   Diagnosis Date    Abdominal cramps     \"feel like I am constantly getting punched  in my stomach- for 20 yrs\"    ADHD (attention deficit hyperactivity disorder)  Anxiety     Asthma     last flare up 7/2016    Depression     Dyspnea on exertion 12/22/2020    History of asthma 12/22/2020    HX OTHER MEDICAL 07/29/2016    \"irreg periods for 20 yrs- had period 1/2016 and 2/2016 and none since then\"    Insomnia     Morbid obesity ([facility] [address] 12/22/2020    Nausea     Preop pulmonary/respiratory exam 12/22/2020     Past Surgical History:   Procedure Laterality Date    CHOLECYSTECTOMY, LAPAROSCOPIC  02/25/14    robotic assisted lap tom    COLONOSCOPY  08/22/2016    internal hemorrhoids    ENDOSCOPY, COLON, DIAGNOSTIC  2015    HERNIA REPAIR  06-03-13    Hernia Repair x's 2    HYSTERECTOMY      UPPER GASTROINTESTINAL ENDOSCOPY  02/05/2021    UPPER GASTROINTESTINAL ENDOSCOPY N/A 2/5/2021    EGD BIOPSY performed by Skyler Krueger MD at [address] [place] [address]  08/02/2016    laprascopic ventral hernia repair with mesh        CURRENT MEDICATIONS    Current Outpatient Rx   Medication Sig Dispense Refill    methylPREDNISolone (MEDROL, SPENCER,) 4 MG tablet Take by mouth as directed on package 21 tablet 0    methocarbamol (ROBAXIN) 500 MG tablet Take 1 tablet by mouth 3 times daily for 5 days 15 tablet 0    ALPRAZolam (XANAX) 1 MG tablet TAKE 1 TABLET BY MOUTH TWICE DAILY AS NEEDED FOR EXTREME ANXIETY AND PANIC ATTACKS      ibuprofen (ADVIL;MOTRIN) 800 MG tablet Take 1 tablet by mouth every 6 hours as needed for Pain 30 tablet 0       ALLERGIES    Allergies   Allergen Reactions    Zofran [Ondansetron Hcl] Hives    Zoloft [Sertraline Hcl]        SOCIAL & FAMILY HISTORY    Social History     Socioeconomic History    Marital status: Single     Spouse name: Not on file    Number of children: Not on file    Years of education: Not on file    Highest education level: Not on file   Occupational History    Not on file   Tobacco Use    Smoking status: Former Smoker     Packs/day: 0.50     Years: 12.00     Pack years: 6.00     Types: Cigarettes     Quit date: 2012     Years since quittin.7    Smokeless tobacco: Never Used   Vaping Use    Vaping Use: Never used   Substance and Sexual Activity    Alcohol use: Yes     Comment: occasionally    Drug use: Yes     Frequency: 7.0 times per week     Types: Marijuana    Sexual activity: Yes     Partners: Female     Birth control/protection: Surgical   Other Topics Concern    Not on file   Social History Narrative    Not on file     Social Determinants of Health     Financial Resource Strain:     Difficulty of Paying Living Expenses:    Food Insecurity:     Worried About Running Out of Food in the Last Year:     [address] in the Last Year:    Transportation Needs:     Lack of Transportation (Medical):  Lack of Transportation (Non-Medical):    Physical Activity:     Days of Exercise per Week:     Minutes of Exercise per Session:    Stress:     Feeling of Stress :    Social Connections:     Frequency of Communication with Friends and Family:     Frequency of Social Gatherings with Friends and Family:     Attends [employer] Services:     Active Member of Clubs or Organizations:     Attends Club or Organization Meetings:     Marital Status:    Intimate Partner Violence:     Fear of Current or Ex-Partner:     Emotionally Abused:     Physically Abused:     Sexually Abused:      Family History   Problem Relation Age of Onset    Heart Disease Maternal Grandfather            PHYSICAL EXAM    VITAL SIGNS: /73   Pulse 70   Temp 99.4 °F (37.4 °C) (Oral)   Resp 15   Ht 5' 1\" (1.549 m)   Wt 201 lb (91.2 kg)   LMP 2016   SpO2 98%   BMI 37.98 kg/m²   Constitutional:  Well developed, well nourished. Appears comfortable  HENT:  Atraumatic, Normocephalic, EOMIs, conjunctiva clear, nasal bones midline  Musculoskeletal:    Neck: Trachea is midline. No gross swelling or discoloration on inspection. No tenderness to palpation or palpable defect.   Full range of motion without pain.    Right Shoulder Exam:   -Inspection:   No obvious defects, deformities, or discoloration. Skin intact. No sulcus sign   - Palpation: No swelling     No redness, or warmth     + Moderate tenderness over the anterolateral right shoulder, greatest over the distal acromion and anterior glenohumeral joint line. No palpable bony or soft tissue defects. No tenderness or palpable crepitus of the clavicle. Compartments are soft throughout the right upper extremity. -ROM:   Very limited active range of motion secondary to pain with forward flexion abduction greater than 45 degrees however able to actively place patient into forward flexed and abducted position and she is able to hold against gravity resistance. Rotator cuff strength testing 3/5 against resistance.       -Provocative tests:  + Neer/ Torrez, Negative Drop Arm. Provacative examination difficulty to perform secondary to patient's pain    No swelling, discoloration, tenderness to palpation, or range of motion deficit of the ipsilateral elbow. Vascular: Distal pulses intact   Integument:  Well hydrated, no rash   Neurologic:  Alert and oriented. Distal sensation intact. No functional deficits of elbows, wrists, hands, or fingers. 4/5  strength of the ipsilateral hand. Equal sensation over the bilateral deltoids and distally. No wrist drop. DTRs and distal sensation equal/intact. Psychiatric: Cooperative, pleasant affect        RADIOLOGY/PROCEDURES         XR SHOULDER RIGHT (MIN 2 VIEWS) (Preliminary result)  Result time 10/21/21 14:31:04  Preliminary result by Vickie Curry MD (10/21/21 14:31:04)                Impression:    1. No acute traumatic injury involving the right shoulder. 2. Findings suggesting chronic right rotator cuff disease. Narrative:    EXAMINATION:   TWO XRAY VIEWS OF THE RIGHT SHOULDER     10/21/2021 2:14 pm     COMPARISON:   None.      HISTORY:   ORDERING SYSTEM PROVIDED HISTORY: Pain nki TECHNOLOGIST PROVIDED HISTORY:   Reason for exam:-Pain nki   Reason for Exam: Pain, NKI     FINDINGS:   There is normal alignment of the right shoulder.  No fracture or osseous   destructive lesion.  Mild degenerative changes are noted in the right AC   joint.  Cortical thickening along the greater tuberosity suggests chronic   rotator cuff disease.  The right clavicle is intact.  The right lung field is   clear. No appreciable soft tissue swelling.                 Preliminary result by Magali Brasher MD (10/21/21 14:29:13)                Impression:    1. No acute traumatic injury involving the right shoulder. 2. Findings suggesting chronic right rotator cuff disease. ED COURSE & MEDICAL DECISION MAKING       Vital signs and nursing notes reviewed during ED course. I have independently evaluated this patient . Supervising MD - Dr. Piedad Salinas - present in the Emergency Department, available for consultation, throughout entirety of  patient care. All pertinent Lab data and radiographic results reviewed with patient at bedside. The patient and/or the family were informed of the results of any tests/labs/imaging, the treatment plan, and time was allotted to answer questions. Differential diagnosis: includes but not limited to ligamentous injury, tendon injury, soft tissue contusion/hematoma, fracture, dislocation, Infection, Neurologic Deficit/Injury, Labral injury, Rotator cuff injury, fracture, dislocation. Clinical  IMPRESSION    1. Acute pain of right shoulder    2. Tendinitis of right shoulder          Patient presents with right shoulder pain without preceding trauma. On exam, pleasant well-appearing 55-year-old female, no acute distress. Vitals are stable. No gross bony deformities redness around the shoulder joint.   Reproducible tenderness over the anterolateral right shoulder over glenohumeral joint line distal acromion palpable bony or soft tissue defects. Very limited active range of motion especially active forward flexion abduction greater than 45 degrees however patient is able to hold arm and forward flexed and abducted position when passively placed and hold against resistance. Negative drop arm. Decreased  strength as well as rotator cuff strength secondary to pain. Positive signs for impingement. X-ray of the right shoulder shows no evidence of acute osseous abnormality but there is chronic rotator cuff disease. At this time, presentation is concerning for overuse/repetitive shoulder musculoskeletal injury including tendinitis versus strain versus sprain versus adhesive capsulitis. We discussed consider symptomatic management, gentle range of motion, work ergonomic lifting changes and outpatient follow-up with orthopedics. Low clinical suspicion for ischemic limb, compartment syndrome, intra-articular joint infection/septic arthritis, DVT, osteomyelitis, arterial/neurologic injury, necrotizing fasciitis, or infected/rapidly expanding hematoma. Patient is discharged in stable condition. Educated on [address] therapy. Muscle relaxer Medrol Dosepak. Patient is instructed to followup with orthopedics in 7-10 days, sooner with worsened symptoms. Return precautions back to the ED discussed for any new or worsening symptoms. Diagnosis and plan discussed in detail with patient who understands and agrees. Patient agrees to return emergency department if symptoms worsen or any new symptoms develop. Comment: Please note this report has been produced using speech recognition software and may contain errors related to that system including errors in grammar, punctuation, and spelling, as well as words and phrases that may be inappropriate. If there are any questions or concerns please feel free to contact the dictating provider for clarification.         Miladis Cuba PA-C  10/21/21 1531

## 2022-01-04 ENCOUNTER — HOSPITAL ENCOUNTER (EMERGENCY)
Age: 42
Discharge: HOME | End: 2022-01-04
Payer: MEDICAID

## 2022-01-04 ENCOUNTER — APPOINTMENT (OUTPATIENT)
Dept: CT IMAGING | Age: 42
End: 2022-01-04
Payer: MEDICAID

## 2022-01-04 ENCOUNTER — APPOINTMENT (OUTPATIENT)
Dept: GENERAL RADIOLOGY | Age: 42
End: 2022-01-04
Payer: MEDICAID

## 2022-01-04 VITALS
HEIGHT: 62 IN | WEIGHT: 203 LBS | DIASTOLIC BLOOD PRESSURE: 74 MMHG | HEART RATE: 80 BPM | OXYGEN SATURATION: 98 % | SYSTOLIC BLOOD PRESSURE: 129 MMHG | RESPIRATION RATE: 22 BRPM | TEMPERATURE: 98.4 F | BODY MASS INDEX: 37.36 KG/M2

## 2022-01-04 DIAGNOSIS — R07.9 CHEST PAIN, UNSPECIFIED TYPE: Primary | ICD-10-CM

## 2022-01-04 DIAGNOSIS — R10.32 ABDOMINAL PAIN, LEFT LOWER QUADRANT: ICD-10-CM

## 2022-01-04 DIAGNOSIS — J12.82 PNEUMONIA DUE TO COVID-19 VIRUS: ICD-10-CM

## 2022-01-04 DIAGNOSIS — U07.1 PNEUMONIA DUE TO COVID-19 VIRUS: ICD-10-CM

## 2022-01-04 LAB
ALBUMIN SERPL-MCNC: 4 GM/DL (ref 3.4–5)
ALP BLD-CCNC: 92 IU/L (ref 40–129)
ALT SERPL-CCNC: 29 U/L (ref 10–40)
ANION GAP SERPL CALCULATED.3IONS-SCNC: 10 MMOL/L (ref 4–16)
AST SERPL-CCNC: 34 IU/L (ref 15–37)
BACTERIA: ABNORMAL /HPF
BANDED NEUTROPHILS ABSOLUTE COUNT: 0.07 K/CU MM
BANDED NEUTROPHILS RELATIVE PERCENT: 1 % (ref 5–11)
BILIRUB SERPL-MCNC: 0.4 MG/DL (ref 0–1)
BILIRUBIN URINE: NEGATIVE MG/DL
BLOOD, URINE: ABNORMAL
BUN BLDV-MCNC: 7 MG/DL (ref 6–23)
CALCIUM SERPL-MCNC: 9 MG/DL (ref 8.3–10.6)
CHLORIDE BLD-SCNC: 103 MMOL/L (ref 99–110)
CLARITY: ABNORMAL
CO2: 24 MMOL/L (ref 21–32)
COLOR: YELLOW
CREAT SERPL-MCNC: 0.6 MG/DL (ref 0.6–1.1)
DIFFERENTIAL TYPE: ABNORMAL
GFR AFRICAN AMERICAN: >60 ML/MIN/1.73M2
GFR NON-AFRICAN AMERICAN: >60 ML/MIN/1.73M2
GLUCOSE BLD-MCNC: 109 MG/DL (ref 70–99)
GLUCOSE, URINE: NEGATIVE MG/DL
HCG QUALITATIVE: NEGATIVE
HCT VFR BLD CALC: 41.5 % (ref 37–47)
HEMOGLOBIN: 13.5 GM/DL (ref 12.5–16)
KETONES, URINE: NEGATIVE MG/DL
LEUKOCYTE ESTERASE, URINE: NEGATIVE
LIPASE: 20 IU/L (ref 13–60)
LYMPHOCYTES ABSOLUTE: 2.2 K/CU MM
LYMPHOCYTES RELATIVE PERCENT: 34 % (ref 24–44)
MCH RBC QN AUTO: 29.2 PG (ref 27–31)
MCHC RBC AUTO-ENTMCNC: 32.5 % (ref 32–36)
MCV RBC AUTO: 89.6 FL (ref 78–100)
MONOCYTES ABSOLUTE: 0.5 K/CU MM
MONOCYTES RELATIVE PERCENT: 7 % (ref 0–4)
MUCUS: ABNORMAL HPF
NITRITE URINE, QUANTITATIVE: NEGATIVE
PDW BLD-RTO: 13 % (ref 11.7–14.9)
PH, URINE: 5 (ref 5–8)
PLATELET # BLD: 286 K/CU MM (ref 140–440)
PMV BLD AUTO: 8.9 FL (ref 7.5–11.1)
POTASSIUM SERPL-SCNC: 3.9 MMOL/L (ref 3.5–5.1)
PRO-BNP: 24.71 PG/ML
PROTEIN UA: 100 MG/DL
RBC # BLD: 4.63 M/CU MM (ref 4.2–5.4)
RBC URINE: ABNORMAL /HPF (ref 0–6)
SEGMENTED NEUTROPHILS ABSOLUTE COUNT: 3.8 K/CU MM
SEGMENTED NEUTROPHILS RELATIVE PERCENT: 58 % (ref 36–66)
SODIUM BLD-SCNC: 137 MMOL/L (ref 135–145)
SPECIFIC GRAVITY UA: 1.03 (ref 1–1.03)
SQUAMOUS EPITHELIAL: 7 /HPF
TOTAL PROTEIN: 7.9 GM/DL (ref 6.4–8.2)
TRICHOMONAS: ABNORMAL /HPF
TROPONIN T: <0.01 NG/ML
TROPONIN T: <0.01 NG/ML
UROBILINOGEN, URINE: NEGATIVE MG/DL (ref 0.2–1)
WBC # BLD: 6.6 K/CU MM (ref 4–10.5)
WBC UA: 31 /HPF (ref 0–5)

## 2022-01-04 PROCEDURE — 99284 EMERGENCY DEPT VISIT MOD MDM: CPT

## 2022-01-04 PROCEDURE — 93005 ELECTROCARDIOGRAM TRACING: CPT | Performed by: EMERGENCY MEDICINE

## 2022-01-04 PROCEDURE — 85007 BL SMEAR W/DIFF WBC COUNT: CPT

## 2022-01-04 PROCEDURE — 83880 ASSAY OF NATRIURETIC PEPTIDE: CPT

## 2022-01-04 PROCEDURE — 74177 CT ABD & PELVIS W/CONTRAST: CPT

## 2022-01-04 PROCEDURE — 80053 COMPREHEN METABOLIC PANEL: CPT

## 2022-01-04 PROCEDURE — 6360000004 HC RX CONTRAST MEDICATION: Performed by: PHYSICIAN ASSISTANT

## 2022-01-04 PROCEDURE — 71275 CT ANGIOGRAPHY CHEST: CPT

## 2022-01-04 PROCEDURE — 83690 ASSAY OF LIPASE: CPT

## 2022-01-04 PROCEDURE — 71046 X-RAY EXAM CHEST 2 VIEWS: CPT

## 2022-01-04 PROCEDURE — 81001 URINALYSIS AUTO W/SCOPE: CPT

## 2022-01-04 PROCEDURE — 85027 COMPLETE CBC AUTOMATED: CPT

## 2022-01-04 PROCEDURE — 84703 CHORIONIC GONADOTROPIN ASSAY: CPT

## 2022-01-04 PROCEDURE — 84484 ASSAY OF TROPONIN QUANT: CPT

## 2022-01-04 RX ADMIN — IOPAMIDOL 85 ML: 755 INJECTION, SOLUTION INTRAVENOUS at 19:41

## 2022-01-04 ASSESSMENT — PAIN SCALES - GENERAL: PAINLEVEL_OUTOF10: 10

## 2022-01-04 ASSESSMENT — PAIN DESCRIPTION - LOCATION: LOCATION: CHEST

## 2022-01-04 ASSESSMENT — ENCOUNTER SYMPTOMS
VOMITING: 0
ABDOMINAL PAIN: 1
EYE PAIN: 0
RHINORRHEA: 0
NAUSEA: 1
DIARRHEA: 0
BACK PAIN: 0

## 2022-01-04 ASSESSMENT — PAIN DESCRIPTION - PAIN TYPE: TYPE: ACUTE PAIN

## 2022-01-04 NOTE — ED PROVIDER NOTES
Emergency Department Encounter  Location: [address] [place] [address]    Patient: Shankar Buchanan  MRN: [id number]  : 1980  Date of evaluation: 2022  ED Provider: Eun Morrison, [facility]  Shankar Buchanan was checked out to me by CHARU Greer . Please see his/her initial documentation for details of the patient's initial ED presentation, physical exam and completed studies. In brief, Shankar Buchanan is a 37 y.o. female that presented to the emergency department For LLQ abdominal pain. Acute on chronic. Pt also was dx with covid 9 days ago. No sob. Pending CT abdomen and CTA chest. She does have hx of remote ob surgery. She is having chest pain. Began today. Pending scans. Discharge if Scans are unremarkable. Cardiac risk factors:      GENERAL APPEARANCE: Awake and alert. Cooperative. No acute distress. HEAD: Normocephalic. Atraumatic. EYES: EOM's grossly intact. Sclera anicteric. ENT: Mucous membranes are moist. Tolerates saliva. No trismus. NECK: Supple. No meningismus. Trachea midline. HEART: RRR. Radial pulses 2+. LUNGS: Respirations unlabored. CTAB  ABDOMEN: Soft. Non-tender. No guarding or rebound. EXTREMITIES: No acute deformities. SKIN: Warm and dry. NEUROLOGICAL: No gross facial drooping. Moves all 4 extremities spontaneously. PSYCHIATRIC: Normal mood.         I have reviewed and interpreted all of the currently available lab results and diagnostics from this visit:  Results for orders placed or performed during the hospital encounter of 22   CBC auto diff   Result Value Ref Range    WBC 6.6 4.0 - 10.5 K/CU MM    RBC 4.63 4.2 - 5.4 M/CU MM    Hemoglobin 13.5 12.5 - 16.0 GM/DL    Hematocrit 41.5 37 - 47 %    MCV 89.6 78 - 100 FL    MCH 29.2 27 - 31 PG    MCHC 32.5 32.0 - 36.0 %    RDW 13.0 11.7 - 14.9 %    Platelets 700 601 - 686 K/CU MM    MPV 8.9 7.5 - 11.1 FL    Bands Relative 1 (L) 5 - 11 %    Segs Relative 58.0 36 - 66 %    Lymphocytes % 34.0 24 - 44 %    Monocytes % 7.0 (H) 0 - 4 %    Bands Absolute 0.07 K/CU MM    Segs Absolute 3.8 K/CU MM    Lymphocytes Absolute 2.2 K/CU MM    Monocytes Absolute 0.5 K/CU MM    Differential Type MANUAL DIFFERENTIAL    CMP   Result Value Ref Range    Sodium 137 135 - 145 MMOL/L    Potassium 3.9 3.5 - 5.1 MMOL/L    Chloride 103 99 - 110 mMol/L    CO2 24 21 - 32 MMOL/L    BUN 7 6 - 23 MG/DL    CREATININE 0.6 0.6 - 1.1 MG/DL    Glucose 109 (H) 70 - 99 MG/DL    Calcium 9.0 8.3 - 10.6 MG/DL    Albumin 4.0 3.4 - 5.0 GM/DL    Total Protein 7.9 6.4 - 8.2 GM/DL    Total Bilirubin 0.4 0.0 - 1.0 MG/DL    ALT 29 10 - 40 U/L    AST 34 15 - 37 IU/L    Alkaline Phosphatase 92 40 - 129 IU/L    GFR Non-African American >60 >60 mL/min/1.73m2    GFR African American >60 >60 mL/min/1.73m2    Anion Gap 10 4 - 16   Troponin   Result Value Ref Range    Troponin T <0.010 <0.01 NG/ML   Brain Natriuretic Peptide   Result Value Ref Range    Pro-BNP 24.71 <300 PG/ML   Lipase   Result Value Ref Range    Lipase 20 13 - 60 IU/L   Urinalysis (Lab)   Result Value Ref Range    Color, UA YELLOW YELLOW    Clarity, UA CLOUDY (A) CLEAR    Glucose, Urine NEGATIVE NEGATIVE MG/DL    Bilirubin Urine NEGATIVE NEGATIVE MG/DL    Ketones, Urine NEGATIVE NEGATIVE MG/DL    Specific Gravity, UA 1.026 1.001 - 1.035    Blood, Urine SMALL (A) NEGATIVE    pH, Urine 5.0 5.0 - 8.0    Protein,  (A) NEGATIVE MG/DL    Urobilinogen, Urine NEGATIVE 0.2 - 1.0 MG/DL    Nitrite Urine, Quantitative NEGATIVE NEGATIVE    Leukocyte Esterase, Urine NEGATIVE NEGATIVE    RBC, UA NONE SEEN 0 - 6 /HPF    WBC, UA 31 (H) 0 - 5 /HPF    Bacteria, UA RARE (A) NEGATIVE /HPF    Squam Epithel, UA 7 /HPF    Mucus, UA MANY (A) NEGATIVE HPF    Trichomonas, UA NONE SEEN NONE SEEN /HPF   HCG Serum, Qualitative   Result Value Ref Range    hCG Qual NEGATIVE    Troponin   Result Value Ref Range    Troponin T <0.010 <0.01 NG/ML     XR CHEST (2 VW)    Result Date: 1/4/2022  EXAMINATION: TWO XRAY VIEWS OF THE CHEST 1/4/2022 1:09 pm COMPARISON: 12/15/2019 HISTORY: ORDERING SYSTEM PROVIDED HISTORY: chest pain, cough, recent covid TECHNOLOGIST PROVIDED HISTORY: Reason for exam:->chest pain, cough, recent covid Reason for Exam: chest pain   cough   recent cv FINDINGS: Faint peripheral nodular airspace opacities at the right upper and mid lung. There is no effusion or pneumothorax. The cardiomediastinal silhouette is without acute process. The osseous structures are without acute process. Faint peripheral nodular airspace opacities at the right upper and mid which may reflect mild changes of atypical viral pneumonitis. Recommend imaging follow-up to resolution versus noncontrast chest CT. CT ABDOMEN PELVIS W IV CONTRAST Additional Contrast? None    Result Date: 1/4/2022  EXAMINATION: CTA OF THE CHEST; CT OF THE ABDOMEN AND PELVIS WITH CONTRAST 1/4/2022 7:40 pm TECHNIQUE: CTA of the chest was performed after the administration of intravenous contrast.  Multiplanar reformatted images are provided for review. MIP images are provided for review. Dose modulation, iterative reconstruction, and/or weight based adjustment of the mA/kV was utilized to reduce the radiation dose to as low as reasonably achievable.; CT of the abdomen and pelvis was performed with the administration of intravenous contrast. Multiplanar reformatted images are provided for review. Dose modulation, iterative reconstruction, and/or weight based adjustment of the mA/kV was utilized to reduce the radiation dose to as low as reasonably achievable. COMPARISON: None.  HISTORY: ORDERING SYSTEM PROVIDED HISTORY: h/o covid, cp, sob, eval for PE TECHNOLOGIST PROVIDED HISTORY: Reason for exam:->h/o covid, cp, sob, eval for PE Decision Support Exception - unselect if not a suspected or confirmed emergency medical condition->Emergency Medical Condition (MA) Reason for Exam: h/o covid, cp, sob, eval for PE FINDINGS: Pulmonary Arteries: Pulmonary arteries are adequately opacified for evaluation. No evidence of intraluminal filling defect to suggest pulmonary embolism. Main pulmonary artery is normal in caliber. Mediastinum: No evidence of mediastinal lymphadenopathy. The heart and pericardium demonstrate no acute abnormality. There is no acute abnormality of the thoracic aorta. Lungs/pleura: MULTIFOCAL SUB SOLID CONSOLIDATIONS COMPATIBLE WITH MULTIFOCAL PNEUMONIA WHICH IS MODERATE. THE APPEARANCE IS THAT OF COVID-19 PNEUMONIA, HOWEVER CLINICAL CORRELATION RECOMMENDED. NO PLEURAL EFFUSION OR PNEUMOTHORAX. Soft Tissues/Bones: No acute bone or soft tissue abnormality. CT ABDOMEN AND PELVIS: ORGANS: Liver: Normal appearance of the liver. Gallbladder: Not visualized. Correlate for prior cholecystectomy. No fluid collections in the gallbladder fossa or biliary ductal dilatation. Spleen: Unremarkable spleen. Pancreas: No peripancreatic inflammatory changes. Adrenal Glands: No focal adrenal abnormalities identified. Kidneys: No hydronephrosis. Bowel: Stomach: Small hiatal hernia. Small bowel: No evidence of small bowel obstruction. Colon: The colon is incompletely distended. No localized inflammatory changes. Appendix: Normal appearance of the appendix. Pelvis: Possible mild free fluid in the pelvis, nonspecific. Incomplete distention of the urinary bladder. No acute findings of the osseous structures or subcutaneous soft tissues. Normal caliber aorta without retroperitoneal lymphadenopathy. No peritoneal inflammatory changes. Suspect postoperative changes at the ventral abdominal wall. No drainable fluid collections. No evidence of pulmonary embolism. Moderate multifocal pneumonia, favoring COVID-19 pneumonia. No acute findings of the abdomen and pelvis.  Small amount of free fluid in the pelvis is likely physiologic. small hiatal hernia RECOMMENDATIONS: Unavailable     CTA PULMONARY W CONTRAST    Result Date: 1/4/2022  EXAMINATION: CTA OF THE CHEST; CT OF THE ABDOMEN AND PELVIS WITH CONTRAST 1/4/2022 7:40 pm TECHNIQUE: CTA of the chest was performed after the administration of intravenous contrast.  Multiplanar reformatted images are provided for review. MIP images are provided for review. Dose modulation, iterative reconstruction, and/or weight based adjustment of the mA/kV was utilized to reduce the radiation dose to as low as reasonably achievable.; CT of the abdomen and pelvis was performed with the administration of intravenous contrast. Multiplanar reformatted images are provided for review. Dose modulation, iterative reconstruction, and/or weight based adjustment of the mA/kV was utilized to reduce the radiation dose to as low as reasonably achievable. COMPARISON: None. HISTORY: ORDERING SYSTEM PROVIDED HISTORY: h/o covid, cp, sob, eval for PE TECHNOLOGIST PROVIDED HISTORY: Reason for exam:->h/o covid, cp, sob, eval for PE Decision Support Exception - unselect if not a suspected or confirmed emergency medical condition->Emergency Medical Condition (MA) Reason for Exam: h/o covid, cp, sob, eval for PE FINDINGS: Pulmonary Arteries: Pulmonary arteries are adequately opacified for evaluation. No evidence of intraluminal filling defect to suggest pulmonary embolism. Main pulmonary artery is normal in caliber. Mediastinum: No evidence of mediastinal lymphadenopathy. The heart and pericardium demonstrate no acute abnormality. There is no acute abnormality of the thoracic aorta. Lungs/pleura: MULTIFOCAL SUB SOLID CONSOLIDATIONS COMPATIBLE WITH MULTIFOCAL PNEUMONIA WHICH IS MODERATE. THE APPEARANCE IS THAT OF COVID-19 PNEUMONIA, HOWEVER CLINICAL CORRELATION RECOMMENDED. NO PLEURAL EFFUSION OR PNEUMOTHORAX. Soft Tissues/Bones: No acute bone or soft tissue abnormality. CT ABDOMEN AND PELVIS: ORGANS: Liver: Normal appearance of the liver. Gallbladder: Not visualized. Correlate for prior cholecystectomy.   No fluid collections in the gallbladder fossa or biliary ductal dilatation. Spleen: Unremarkable spleen. Pancreas: No peripancreatic inflammatory changes. Adrenal Glands: No focal adrenal abnormalities identified. Kidneys: No hydronephrosis. Bowel: Stomach: Small hiatal hernia. Small bowel: No evidence of small bowel obstruction. Colon: The colon is incompletely distended. No localized inflammatory changes. Appendix: Normal appearance of the appendix. Pelvis: Possible mild free fluid in the pelvis, nonspecific. Incomplete distention of the urinary bladder. No acute findings of the osseous structures or subcutaneous soft tissues. Normal caliber aorta without retroperitoneal lymphadenopathy. No peritoneal inflammatory changes. Suspect postoperative changes at the ventral abdominal wall. No drainable fluid collections. No evidence of pulmonary embolism. Moderate multifocal pneumonia, favoring COVID-19 pneumonia. No acute findings of the abdomen and pelvis. Small amount of free fluid in the pelvis is likely physiologic. small hiatal hernia RECOMMENDATIONS: Unavailable       Final ED Course and MDM: In brief, Sandra Bolanos is a 37 y.o. female whose care was signed out to me by the outgoing provider. In brief, has Covid. Covid pneumonia chest pain likely likely secondary to pleuritic aspect of Covid pneumonia. CT angiogram negative no evidence of pulmonary embolism. Initial and repeat delta troponin are negative here in the ED negative EKG per attending physician interpretation. No acute cardiac processes noted are identifiable patient is educated on supportive care return precautions for Covid pneumonia    ED Medication Orders (From admission, onward)    Start Ordered     Status Ordering Provider    01/04/22 1941 01/04/22 1941  iopamidol (ISOVUE-370) 76 % injection 85 mL  IMG ONCE PRN         Last MAR action: Given - by Elvin Andrea on 01/04/22 at [facility]          Final Impression      1. Chest pain, unspecified type    2. Abdominal pain, left lower quadrant    3.  Pneumonia due to COVID-19 virus        DISPOSITION Decision To Discharge 01/04/2022 08:00:12 PM     (Please note that portions of this note may have been completed with a voice recognition program. Efforts were made to edit the dictations but occasionally words are mis-transcribed.)    Orville Santos [address]  01/04/22 2003

## 2022-01-04 NOTE — ED NOTES
Bed: ED-21  Expected date:   Expected time:   Means of arrival:   Comments:  Covid- left [id number], RN  01/04/22 8840

## 2022-01-04 NOTE — ED PROVIDER NOTES
As [id number], I performed a medical screening history and physical exam on this patient. HISTORY OF PRESENT ILLNESS  Pretty Babb is a 37 y.o. female presents emergency department today with a chief complaint of chest pain, upper abdominal pain, nausea. Patient states that she is recently getting over Covid, tested +9 days ago. Started back to work today and started having some chest pains feel that she cannot breathe. Locates it generally into the upper chest area. No radiation of symptoms. No significant history of heart issues. Also has some epigastrium discomfort, nausea after eating. Relating it to the Covid. [facility] PHYSICAL EXAM  /74   Pulse 80   Temp 98.4 °F (36.9 °C) (Oral)   Resp 22   Ht 5' 2\" (1.575 m)   Wt 203 lb (92.1 kg)   LMP 02/29/2016   SpO2 98%   BMI 37.13 kg/m²     On exam, the patient appears in no acute distress. Speech is clear. Breathing is unlabored. Moves all extremities    Comment: Please note this report has been produced using speech recognition software and may contain errors related to that system including errors in grammar, punctuation, and spelling, as well as words and phrases that may be inappropriate. If there are any questions or concerns please feel free to contact the dictating provider for clarification.         CHARU Faria  01/04/22 1577

## 2022-01-04 NOTE — ED PROVIDER NOTES
**ADVANCED PRACTICE PROVIDER, I HAVE EVALUATED THIS PATIENT**        [address] [facility]      Pt Name: Qamar Mace  WVT:[id number]  [place] 1980  Date of evaluation: 1/4/2022  Provider: Rubin Medellin PA-C      Chief Complaint:    Chief Complaint   Patient presents with    Positive For Covid-19     9 days in     Chest Pain         Nursing Notes, Past Medical Hx, Past Surgical Hx, Social Hx, Allergies, and Family Hx were all reviewed and agreed with or any disagreements were addressed in the HPI.    HPI: (Location, Duration, Timing, Severity, Quality, Assoc Sx, Context, Modifying factors)    Chief Complaint of chest pain, abdominal pain    This is a  37 y.o. female who presents  with complaint of chest pain, abdominal pain. She reports a positive COVID-19 test at her worksite approximately 9 days ago. Today she developed onset of substernal chest pain, as well as left lower quadrant pain. She has had some mild nausea. She states the pain in the chest is better when she lies prone. She does describe normal bowel movements, as well as  multiple abdominal surgical histories.   She denies any hemoptysis, pain or swelling to her lower legs, dysuria, flank pain vomiting          PastMedical/Surgical History:      Diagnosis Date    Abdominal cramps     \"feel like I am constantly getting punched  in my stomach- for 20 yrs\"    ADHD (attention deficit hyperactivity disorder)     Anxiety     Asthma     last flare up 7/2016    Depression     Dyspnea on exertion 12/22/2020    History of asthma 12/22/2020    HX OTHER MEDICAL 07/29/2016    \"irreg periods for 20 yrs- had period 1/2016 and 2/2016 and none since then\"    Insomnia     Morbid obesity ([facility] [address] 12/22/2020    Nausea     Preop pulmonary/respiratory exam 12/22/2020         Procedure Laterality Date    CHOLECYSTECTOMY, LAPAROSCOPIC  02/25/14    robotic assisted lap tom    COLONOSCOPY  08/22/2016    internal hemorrhoids    ENDOSCOPY, COLON, DIAGNOSTIC  2015    HERNIA REPAIR  06-03-13    Hernia Repair x's 2    HYSTERECTOMY      UPPER GASTROINTESTINAL ENDOSCOPY  02/05/2021    UPPER GASTROINTESTINAL ENDOSCOPY N/A 2/5/2021    EGD BIOPSY performed by Bruno Galvin MD at [address] [place] [address]  08/02/2016    laprascopic ventral hernia repair with mesh        Medications:  Previous Medications    ALPRAZOLAM (XANAX) 1 MG TABLET    TAKE 1 TABLET BY MOUTH TWICE DAILY AS NEEDED FOR EXTREME ANXIETY AND PANIC ATTACKS    IBUPROFEN (ADVIL;MOTRIN) 800 MG TABLET    Take 1 tablet by mouth every 6 hours as needed for Pain    METHYLPREDNISOLONE (MEDROL, SPENCER,) 4 MG TABLET    Take by mouth as directed on package         Review of Systems:  (2-9 systems needed)  Review of Systems   Constitutional: Negative for chills and fever. HENT: Negative for congestion and rhinorrhea. Eyes: Negative for pain and visual disturbance. Cardiovascular: Positive for chest pain. Negative for leg swelling. Gastrointestinal: Positive for abdominal pain and nausea. Negative for diarrhea and vomiting. Genitourinary: Negative for dysuria and hematuria. Musculoskeletal: Negative for back pain and myalgias. Skin: Negative for rash and wound. Neurological: Negative for dizziness and light-headedness. \"Positives and Pertinent negatives as per HPI\"    Physical Exam:  Physical Exam  Vitals and nursing note reviewed. Constitutional:       Appearance: Normal appearance. She is well-developed. She is not ill-appearing or diaphoretic. HENT:      Head: Normocephalic and atraumatic. Right Ear: External ear normal.      Left Ear: External ear normal.      Nose: Nose normal.   Eyes:      General:         Right eye: No discharge. Left eye: No discharge. Pulmonary:      Effort: Pulmonary effort is normal.      Breath sounds: No stridor. No wheezing.    Abdominal: Tenderness: There is abdominal tenderness (LLQ). Musculoskeletal:         General: Normal range of motion. Cervical back: Normal range of motion and neck supple. Skin:     General: Skin is warm and dry. Coloration: Skin is not pale. Neurological:      General: No focal deficit present. Mental Status: She is alert and oriented to person, place, and time. Psychiatric:         Mood and Affect: Mood normal.         Behavior: Behavior normal.         MEDICAL DECISION MAKING    41year female presents with above HPI. Patient initially seen in triage area, with initial work-up performed by the triage provider. Reportedly patient positive for Covid 9 days ago. Reviewing EMR patient is followed by general surgeon Dr. Sabrina Lopez and I did review office visit note from approximately 4 months ago, do mention some chronic pelvic and abdominal adhesions, some chronic left lower quadrant pain, at that time, CT abdomen pelvis have been ordered, but it does not appear that she ever got 1. At this time, her vitals are stable. Initial work-up was ordered in triage, does appear to be unremarkable. She has declined analgesics here. We will plan for CT abdomen pelvis with contrast for further evaluation of abdominal pain, as well as a CTA to evaluate for possible pulmonary embolism given her history of COVID-19, and chest pain. 06:00 PM  [facility]. We discussed the pertinent history, physical exam, completed/pending test results (if applicable) and current treatment plan. Please refer to his/her chart for the patient's remaining emergency department course and final disposition.           Vitals:    Vitals:    01/04/22 1240   BP: 129/74   Pulse: 80   Resp: 22   Temp: 98.4 °F (36.9 °C)   TempSrc: Oral   SpO2: 98%   Weight: 203 lb (92.1 kg)   Height: 5' 2\" (1.575 m)       LABS:  Labs Reviewed   CBC WITH AUTO DIFFERENTIAL - Abnormal; Notable for the following components:       Result Value    Bands Relative 1 (*)     Monocytes % 7.0 (*)     All other components within normal limits   COMPREHENSIVE METABOLIC PANEL - Abnormal; Notable for the following components:    Glucose 109 (*)     All other components within normal limits   URINALYSIS - Abnormal; Notable for the following components:    Clarity, UA CLOUDY (*)     Blood, Urine SMALL (*)     Protein,  (*)     WBC, UA 31 (*)     Bacteria, UA RARE (*)     Mucus, UA MANY (*)     All other components within normal limits   TROPONIN   BRAIN NATRIURETIC PEPTIDE   LIPASE   HCG, SERUM, QUALITATIVE   TROPONIN        Remainder of labs reviewed and were negative at this time or not returned at the time of this note. RADIOLOGY:   Non-plain film images such as CT, Ultrasound and MRI are read by the radiologist. Navid Sewell PA-C have directly visualized the radiologic plain film image(s) with the below findings:      Interpretation per the Radiologist below, if available at the time of this note:    XR CHEST (2 VW)   Final Result   Faint peripheral nodular airspace opacities at the right upper and mid which   may reflect mild changes of atypical viral pneumonitis. Recommend imaging   follow-up to resolution versus noncontrast chest CT. CTA PULMONARY W CONTRAST    (Results Pending)   CT ABDOMEN PELVIS W IV CONTRAST Additional Contrast? None    (Results Pending)        XR CHEST (2 VW)    Result Date: 1/4/2022  EXAMINATION: TWO XRAY VIEWS OF THE CHEST 1/4/2022 1:09 pm COMPARISON: 12/15/2019 HISTORY: ORDERING SYSTEM PROVIDED HISTORY: chest pain, cough, recent covid TECHNOLOGIST PROVIDED HISTORY: Reason for exam:->chest pain, cough, recent covid Reason for Exam: chest pain   cough   recent cv FINDINGS: Faint peripheral nodular airspace opacities at the right upper and mid lung. There is no effusion or pneumothorax. The cardiomediastinal silhouette is without acute process. The osseous structures are without acute process. Faint peripheral nodular airspace opacities at the right upper and mid which may reflect mild changes of atypical viral pneumonitis. Recommend imaging follow-up to resolution versus noncontrast chest CT. MEDICAL DECISION MAKING / ED COURSE:      CLINICAL IMPRESSION:  1. Chest pain, unspecified type    2. COVID    3. Abdominal pain, left lower quadrant        DISPOSITION        PATIENT REFERRED TO:  No follow-up provider specified.     DISCHARGE MEDICATIONS:  New Prescriptions    No medications on file       DISCONTINUED MEDICATIONS:  Discontinued Medications    No medications on file              (Please note the MDM and HPI sections of this note were completed with a voice recognition program.  Efforts were made to edit the dictations but occasionally words are mis-transcribed.)    Electronically signed, Darrius Peng PA-C,          Darrius Peng PA-C  01/04/22 1938

## 2022-01-05 ENCOUNTER — CARE COORDINATION (OUTPATIENT)
Dept: CARE COORDINATION | Age: 42
End: 2022-01-05

## 2022-01-05 ENCOUNTER — HOSPITAL ENCOUNTER (EMERGENCY)
Age: 42
Discharge: HOME | End: 2022-01-05
Attending: EMERGENCY MEDICINE
Payer: MEDICAID

## 2022-01-05 VITALS
WEIGHT: 203 LBS | DIASTOLIC BLOOD PRESSURE: 108 MMHG | SYSTOLIC BLOOD PRESSURE: 155 MMHG | HEIGHT: 62 IN | RESPIRATION RATE: 20 BRPM | BODY MASS INDEX: 37.36 KG/M2 | TEMPERATURE: 97.6 F | HEART RATE: 70 BPM | OXYGEN SATURATION: 100 %

## 2022-01-05 DIAGNOSIS — U07.1 COVID-19: ICD-10-CM

## 2022-01-05 DIAGNOSIS — R07.9 CHEST PAIN, UNSPECIFIED TYPE: ICD-10-CM

## 2022-01-05 DIAGNOSIS — F41.0 PANIC ATTACK: Primary | ICD-10-CM

## 2022-01-05 LAB
ALBUMIN SERPL-MCNC: 3.9 GM/DL (ref 3.4–5)
ALP BLD-CCNC: 93 IU/L (ref 40–129)
ALT SERPL-CCNC: 25 U/L (ref 10–40)
ANION GAP SERPL CALCULATED.3IONS-SCNC: 14 MMOL/L (ref 4–16)
AST SERPL-CCNC: 32 IU/L (ref 15–37)
BASOPHILS ABSOLUTE: 0 K/CU MM
BASOPHILS RELATIVE PERCENT: 0.4 % (ref 0–1)
BILIRUB SERPL-MCNC: 0.5 MG/DL (ref 0–1)
BUN BLDV-MCNC: 6 MG/DL (ref 6–23)
CALCIUM SERPL-MCNC: 8.9 MG/DL (ref 8.3–10.6)
CHLORIDE BLD-SCNC: 101 MMOL/L (ref 99–110)
CO2: 20 MMOL/L (ref 21–32)
CREAT SERPL-MCNC: 0.5 MG/DL (ref 0.6–1.1)
DIFFERENTIAL TYPE: ABNORMAL
EKG ATRIAL RATE: 78 BPM
EKG DIAGNOSIS: NORMAL
EKG P AXIS: 47 DEGREES
EKG P-R INTERVAL: 190 MS
EKG Q-T INTERVAL: 392 MS
EKG QRS DURATION: 72 MS
EKG QTC CALCULATION (BAZETT): 446 MS
EKG R AXIS: 29 DEGREES
EKG T AXIS: 51 DEGREES
EKG VENTRICULAR RATE: 78 BPM
EOSINOPHILS ABSOLUTE: 0 K/CU MM
EOSINOPHILS RELATIVE PERCENT: 0.5 % (ref 0–3)
GFR AFRICAN AMERICAN: >60 ML/MIN/1.73M2
GFR NON-AFRICAN AMERICAN: >60 ML/MIN/1.73M2
GLUCOSE BLD-MCNC: 117 MG/DL (ref 70–99)
HCT VFR BLD CALC: 41.4 % (ref 37–47)
HEMOGLOBIN: 13.4 GM/DL (ref 12.5–16)
IMMATURE NEUTROPHIL %: 0.2 % (ref 0–0.43)
LYMPHOCYTES ABSOLUTE: 2.7 K/CU MM
LYMPHOCYTES RELATIVE PERCENT: 49.6 % (ref 24–44)
MCH RBC QN AUTO: 29.4 PG (ref 27–31)
MCHC RBC AUTO-ENTMCNC: 32.4 % (ref 32–36)
MCV RBC AUTO: 90.8 FL (ref 78–100)
MONOCYTES ABSOLUTE: 0.6 K/CU MM
MONOCYTES RELATIVE PERCENT: 10.1 % (ref 0–4)
PDW BLD-RTO: 13.1 % (ref 11.7–14.9)
PLATELET # BLD: 283 K/CU MM (ref 140–440)
PMV BLD AUTO: 9 FL (ref 7.5–11.1)
POTASSIUM SERPL-SCNC: 4.2 MMOL/L (ref 3.5–5.1)
RBC # BLD: 4.56 M/CU MM (ref 4.2–5.4)
SEGMENTED NEUTROPHILS ABSOLUTE COUNT: 2.2 K/CU MM
SEGMENTED NEUTROPHILS RELATIVE PERCENT: 39.2 % (ref 36–66)
SODIUM BLD-SCNC: 135 MMOL/L (ref 135–145)
TOTAL IMMATURE NEUTOROPHIL: 0.01 K/CU MM
TOTAL PROTEIN: 7.1 GM/DL (ref 6.4–8.2)
TROPONIN T: <0.01 NG/ML
WBC # BLD: 5.5 K/CU MM (ref 4–10.5)
WBC # BLD: ABNORMAL 10*3/UL

## 2022-01-05 PROCEDURE — 99285 EMERGENCY DEPT VISIT HI MDM: CPT

## 2022-01-05 PROCEDURE — 93005 ELECTROCARDIOGRAM TRACING: CPT | Performed by: EMERGENCY MEDICINE

## 2022-01-05 PROCEDURE — 84484 ASSAY OF TROPONIN QUANT: CPT

## 2022-01-05 PROCEDURE — 93010 ELECTROCARDIOGRAM REPORT: CPT | Performed by: INTERNAL MEDICINE

## 2022-01-05 PROCEDURE — 85025 COMPLETE CBC W/AUTO DIFF WBC: CPT

## 2022-01-05 PROCEDURE — 6370000000 HC RX 637 (ALT 250 FOR IP): Performed by: EMERGENCY MEDICINE

## 2022-01-05 PROCEDURE — 80053 COMPREHEN METABOLIC PANEL: CPT

## 2022-01-05 RX ORDER — IBUPROFEN 800 MG/1
800 TABLET ORAL 2 TIMES DAILY PRN
Qty: 60 TABLET | Refills: 0 | Status: SHIPPED | OUTPATIENT
Start: 2022-01-05

## 2022-01-05 RX ORDER — HYDROCODONE BITARTRATE AND ACETAMINOPHEN 5; 325 MG/1; MG/1
1 TABLET ORAL EVERY 6 HOURS PRN
Qty: 10 TABLET | Refills: 0 | Status: SHIPPED | OUTPATIENT
Start: 2022-01-05 | End: 2022-01-08

## 2022-01-05 RX ORDER — LORAZEPAM 1 MG/1
1 TABLET ORAL ONCE
Status: COMPLETED | OUTPATIENT
Start: 2022-01-05 | End: 2022-01-05

## 2022-01-05 RX ADMIN — LORAZEPAM 1 MG: 1 TABLET ORAL at 08:21

## 2022-01-05 NOTE — ED NOTES
Care assumed at this time. Report from Maxime Souza, [facility].      Daiana Gutierrez RN  01/05/22 1595

## 2022-01-05 NOTE — ED NOTES
Sabino Morocho [phone] patients gf would like to be called with updates     Brunilda Mayorga.  Shahla  01/05/22 6286

## 2022-01-05 NOTE — ED NOTES
Reviewed discharge instructions with patient and family. All voice understanding/deny questions. Removed from floor in wheelchair. Condition stable.        Davi Alegre RN  01/05/22 3485

## 2022-01-05 NOTE — ED NOTES
Bed: ED-32  Expected date:   Expected time:   Means of arrival:   Comments:  EMS     Ashley Yang  01/05/22 3828

## 2022-01-05 NOTE — ED PROVIDER NOTES
Emergency Department Encounter    Patient: Quentin Sotomayor  MRN: [id number]  : 1980  Date of Evaluation: 2022  ED Provider:  Archie Jacobo MD    Triage Chief Complaint:   Chest Pain and Anxiety    [place]:  Quentin Sotomayor is a 37 y.o. female that presents with complaint of chest pain. She is very anxious, tachypneic, with each breath is groaning, eyes closed, had to touch her to get her to respond to me and she jerked and opened her eyes wide and started saying \"I'm scared, I'm scared\" repetitively and very quickly. She then told me that she was having \"heart pain\". Kept repeating \"my heart hurts\". She cannot describe it well other than that it hurts when she takes a breath and it just hurts all across her chest.  She had been diagnosed with Covid 9 to 10 days ago. Was seen yesterday for same. Denies shortness of breath. Denies fevers. No vomiting or nausea. She does have a history of anxiety and feels like she is having a panic attack. Girlfriend called 911. No leg pain or swelling. No previous cardiac problems    However did come in to see patient, as patient is Covid positive she cannot be in the room but I spoke with her in the lobby, patient does have panic attacks, she felt like she was having one this morning. She does not take her Xanax regularly, \"only when she feels like she is anxious\". ROS - see HPI, below listed is current ROS at time of my eval:  10 systems reviewed and negative except as above.      Past Medical History:   Diagnosis Date    Abdominal cramps     \"feel like I am constantly getting punched  in my stomach- for 20 yrs\"    ADHD (attention deficit hyperactivity disorder)     Anxiety     Asthma     last flare up 2016    Depression     Dyspnea on exertion 2020    History of asthma 2020    HX OTHER MEDICAL 2016    \"irreg periods for 20 yrs- had period 2016 and 2016 and none since then\"    Insomnia     Morbid obesity ([address] 2020    Nausea     Preop pulmonary/respiratory exam 2020     Past Surgical History:   Procedure Laterality Date    CHOLECYSTECTOMY, LAPAROSCOPIC  14    robotic assisted lap tom    COLONOSCOPY  2016    internal hemorrhoids    ENDOSCOPY, COLON, DIAGNOSTIC      HERNIA REPAIR  13    Hernia Repair x's 2    HYSTERECTOMY      UPPER GASTROINTESTINAL ENDOSCOPY  2021    UPPER GASTROINTESTINAL ENDOSCOPY N/A 2021    EGD BIOPSY performed by Hang Gonzalez MD at [place] [address]  2016    laprascopic ventral hernia repair with mesh      Family History   Problem Relation Age of Onset    Heart Disease Maternal Grandfather      Social History     Socioeconomic History    Marital status: Single     Spouse name: Not on file    Number of children: Not on file    Years of education: Not on file    Highest education level: Not on file   Occupational History    Not on file   Tobacco Use    Smoking status: Former Smoker     Packs/day: 0.50     Years: 12.00     Pack years: 6.00     Types: Cigarettes     Quit date: 2012     Years since quittin.9    Smokeless tobacco: Never Used   Vaping Use    Vaping Use: Never used   Substance and Sexual Activity    Alcohol use: Yes     Comment: occasionally    Drug use: Yes     Frequency: 7.0 times per week     Types: Marijuana Eudelia Cely)    Sexual activity: Yes     Partners: Female     Birth control/protection: Surgical   Other Topics Concern    Not on file   Social History Narrative    Not on file     Social Determinants of Health     Financial Resource Strain:     Difficulty of Paying Living Expenses: Not on file   Food Insecurity:     Worried About Running Out of Food in the Last Year: Not on file    Yobany of Food in the Last Year: Not on file   Transportation Needs:     Lack of Transportation (Medical): Not on file    Lack of Transportation (Non-Medical):  Not on file   Physical Activity:  Days of Exercise per Week: Not on file    Minutes of Exercise per Session: Not on file   Stress:     Feeling of Stress : Not on file   Social Connections:     Frequency of Communication with Friends and Family: Not on file    Frequency of Social Gatherings with Friends and Family: Not on file    Attends Restorationist Services: Not on file    Active Member of [address] [place] [address] [employer] or Organizations: Not on file    Attends Club or Organization Meetings: Not on file    Marital Status: Not on file   Intimate Partner Violence:     Fear of Current or Ex-Partner: Not on file    Emotionally Abused: Not on file    Physically Abused: Not on file    Sexually Abused: Not on file   Housing Stability:     Unable to Pay for Housing in the Last Year: Not on file    Number of [place] in the Last Year: Not on file    Unstable Housing in the Last Year: Not on file     No current facility-administered medications for this encounter. Current Outpatient Medications   Medication Sig Dispense Refill    ibuprofen (ADVIL;MOTRIN) 800 MG tablet Take 1 tablet by mouth 2 times daily as needed for Pain 60 tablet 0    HYDROcodone-acetaminophen (NORCO) 5-325 MG per tablet Take 1 tablet by mouth every 6 hours as needed for Pain for up to 3 days. 10 tablet 0    methylPREDNISolone (MEDROL, SPENCER,) 4 MG tablet Take by mouth as directed on package 21 tablet 0    ALPRAZolam (XANAX) 1 MG tablet TAKE 1 TABLET BY MOUTH TWICE DAILY AS NEEDED FOR EXTREME ANXIETY AND PANIC ATTACKS       Allergies   Allergen Reactions    Zofran [Ondansetron Hcl] Hives    Zoloft [Sertraline Hcl]        Nursing Notes Reviewed    Physical Exam:  ED Triage Vitals [01/05/22 0704]   Enc Vitals Group      BP (!) 155/108      Pulse 70      Resp 20      Temp 97.6 °F (36.4 °C)      Temp Source Axillary      SpO2 100 %      Weight 203 lb (92.1 kg)      Height 5' 2\" (1.575 m)      Head Circumference       Peak Flow       Pain Score       Pain Loc       Pain Edu? Excl.  in GC?          My pulse ox interpretation is - normal    General appearance:  Very anxious, tachypneic, with each breath is groaning, eyes closed, had to touch her to get her to respond to me and she jerked and opened her eyes wide and started saying \"I'm scared, I'm scared\" repetitively and very quickly. Skin:  Warm. Dry. Eye:  Extraocular movements intact. Ears, nose, mouth and throat:  Oral mucosa moist   Neck:  Trachea midline. Extremity:  No swelling. Normal ROM     Heart:  Regular rate and rhythm, normal S1 & S2, no extra heart sounds. Perfusion:  intact  Respiratory:  Lungs clear to auscultation bilaterally. Respirations nonlabored. Abdominal:  Normal bowel sounds. Soft. Nontender. Non distended. Neurological:  Alert and oriented           Psychiatric:  Very anxious, tachypneic, with each breath is groaning, eyes closed, had to touch her to get her to respond to me and she jerked and opened her eyes wide and started saying \"I'm scared, I'm scared\" repetitively and very quickly. Then when asked why she is scared states \"my heart hurts, my heart hurts\" over and over.      I have reviewed and interpreted all of the currently available lab results from this visit (if applicable):  Results for orders placed or performed during the hospital encounter of 01/05/22   CBC Auto Differential   Result Value Ref Range    WBC 5.5 4.0 - 10.5 K/CU MM    RBC 4.56 4.2 - 5.4 M/CU MM    Hemoglobin 13.4 12.5 - 16.0 GM/DL    Hematocrit 41.4 37 - 47 %    MCV 90.8 78 - 100 FL    MCH 29.4 27 - 31 PG    MCHC 32.4 32.0 - 36.0 %    RDW 13.1 11.7 - 14.9 %    Platelets 742 788 - 734 K/CU MM    MPV 9.0 7.5 - 11.1 FL    Immature Neutrophil % 0.2 0 - 0.43 %    Segs Relative 39.2 36 - 66 %    Eosinophils % 0.5 0 - 3 %    Basophils % 0.4 0 - 1 %    Lymphocytes % 49.6 (H) 24 - 44 %    Monocytes % 10.1 (H) 0 - 4 %    Total Immature Neutrophil 0.01 K/CU MM    Segs Absolute 2.2 K/CU MM    Eosinophils Absolute 0.0 K/CU MM Basophils Absolute 0.0 K/CU MM    Lymphocytes Absolute 2.7 K/CU MM    Monocytes Absolute 0.6 K/CU MM    Differential Type       AUTOMATED DIFF RESULTS CONFIRMED BY SMEAR REVIEW  AUTOMATED DIFFERENTIAL      WBC Morphology OCCASIONAL    Comprehensive Metabolic Panel   Result Value Ref Range    Sodium 135 135 - 145 MMOL/L    Potassium 4.2 3.5 - 5.1 MMOL/L    Chloride 101 99 - 110 mMol/L    CO2 20 (L) 21 - 32 MMOL/L    BUN 6 6 - 23 MG/DL    CREATININE 0.5 (L) 0.6 - 1.1 MG/DL    Glucose 117 (H) 70 - 99 MG/DL    Calcium 8.9 8.3 - 10.6 MG/DL    Albumin 3.9 3.4 - 5.0 GM/DL    Total Protein 7.1 6.4 - 8.2 GM/DL    Total Bilirubin 0.5 0.0 - 1.0 MG/DL    ALT 25 10 - 40 U/L    AST 32 15 - 37 IU/L    Alkaline Phosphatase 93 40 - 129 IU/L    GFR Non-African American >60 >60 mL/min/1.73m2    GFR African American >60 >60 mL/min/1.73m2    Anion Gap 14 4 - 16   Troponin   Result Value Ref Range    Troponin T <0.010 <0.01 NG/ML   EKG 12 Lead   Result Value Ref Range    Ventricular Rate 78 BPM    Atrial Rate 78 BPM    P-R Interval 190 ms    QRS Duration 72 ms    Q-T Interval 392 ms    QTc Calculation (Bazett) 446 ms    P Axis 47 degrees    R Axis 29 degrees    T Axis 51 degrees    Diagnosis       Normal sinus rhythm  Normal ECG  No previous ECGs available        Radiographs (if obtained):  Radiologist's Report Reviewed:  No results found. EKG (if obtained): (All EKG's are interpreted by myself in the absence of a cardiologist)  Normal sinus rhythm with rate of 67 bpm, normal intervals. No ST elevation. No previous to compare. MDM:  45-year-old female with history as above presents with complaint of \"heart pain\", she is extremely anxious and was amenable to taking a dose of Ativan when I offered it to her, was feeling much better after that. Her troponin is normal, her labs here are stable compared to yesterday.   I did go through her CT results from yesterday, that she likely had Covid pneumonia that is likely now resolving and that is causing pain and inflammation. We discussed pain medication, ibuprofen. We discussed continued up and walk around, not remaining in bed, to avoid blood clots. I do not believe she requires repeat imaging of her lungs today vitals are stable, she is not in distress, and had extensive work-up yesterday including a CT angiogram PE pulmonary study. She and girlfriend are comfortable with plan for discharge, given return precautions. Encouraged to take her Xanax as it is prescribed twice daily and to follow-up closely with her counselor and psychiatrist.  All questions answered    Clinical Impression:  1. Panic attack    2. Chest pain, unspecified type    3. COVID-19      Disposition referral (if applicable): Elizabeth Cronin  [address] [facility]  [id number]  [place] [address]  [phone]    Schedule an appointment as soon as possible for a visit       [facility] Emergency Department  [facility] [id number]  [phone]    If symptoms worsen    Disposition medications (if applicable):  Discharge Medication List as of 1/5/2022  9:05 AM      START taking these medications    Details   HYDROcodone-acetaminophen (NORCO) 5-325 MG per tablet Take 1 tablet by mouth every 6 hours as needed for Pain for up to 3 days. , Disp-10 tablet, R-0Normal           ED Provider Disposition Time  DISPOSITION Decision To Discharge 01/05/2022 08:56:34 AM      Comment: Please note this report has been produced using speech recognition software and may contain errors related to that system including errors in grammar, punctuation, and spelling, as well as words and phrases that may be inappropriate. Efforts were made to edit the dictations.         Fiona Lamar MD  01/05/22 3255

## 2022-01-05 NOTE — ED TRIAGE NOTES
Pt presents to ED with complaints of chest pain. Pt has hx of anxiety attacks. Family states patient was awoken from sleep with panic like symptoms. PT making a gulping noise upon intake. Pt able to follow commands, AxOx4.

## 2022-01-05 NOTE — ED PROVIDER NOTES
EKG:   Normal sinus rhythm with a rate of 78. NM interval 190, QRS 72, QTc 446. No ST elevations or depressions. Normal T waves. Impression: Normal EKG. When compared to previous EKG from 1/5/2021, there are no significant changes.      Jeimy Hernández MD  01/05/22 5660

## 2022-01-06 NOTE — CARE COORDINATION
Second attempt. [facility] call to pt regarding ER / Covid follow up. No answer. Left message for pt requesting return call to [facility]. No further outreach to be completed.

## 2022-03-25 LAB
EKG ATRIAL RATE: 78 BPM
EKG DIAGNOSIS: NORMAL
EKG P AXIS: 47 DEGREES
EKG P-R INTERVAL: 190 MS
EKG Q-T INTERVAL: 392 MS
EKG QRS DURATION: 72 MS
EKG QTC CALCULATION (BAZETT): 446 MS
EKG R AXIS: 29 DEGREES
EKG T AXIS: 51 DEGREES
EKG VENTRICULAR RATE: 78 BPM

## 2022-03-28 LAB
EKG ATRIAL RATE: 67 BPM
EKG DIAGNOSIS: NORMAL
EKG P AXIS: 34 DEGREES
EKG P-R INTERVAL: 202 MS
EKG Q-T INTERVAL: 414 MS
EKG QRS DURATION: 76 MS
EKG QTC CALCULATION (BAZETT): 437 MS
EKG R AXIS: 39 DEGREES
EKG T AXIS: 54 DEGREES
EKG VENTRICULAR RATE: 67 BPM

## 2022-03-28 PROCEDURE — 93010 ELECTROCARDIOGRAM REPORT: CPT | Performed by: INTERNAL MEDICINE

## 2023-01-19 ENCOUNTER — HOSPITAL ENCOUNTER (OUTPATIENT)
Dept: SLEEP CENTER | Age: 43
Discharge: HOME | End: 2023-01-19
Payer: MEDICAID

## 2023-01-19 VITALS
BODY MASS INDEX: 46.63 KG/M2 | HEIGHT: 61 IN | WEIGHT: 247 LBS | DIASTOLIC BLOOD PRESSURE: 79 MMHG | SYSTOLIC BLOOD PRESSURE: 128 MMHG | OXYGEN SATURATION: 96 % | HEART RATE: 74 BPM

## 2023-01-19 DIAGNOSIS — E66.01 MORBID OBESITY WITH BMI OF 45.0-49.9, ADULT (HCC): ICD-10-CM

## 2023-01-19 DIAGNOSIS — Z87.891 EX-SMOKER: ICD-10-CM

## 2023-01-19 DIAGNOSIS — G47.33 OSA (OBSTRUCTIVE SLEEP APNEA): ICD-10-CM

## 2023-01-19 DIAGNOSIS — G47.10 HYPERSOMNIA: ICD-10-CM

## 2023-01-19 PROCEDURE — 99211 OFF/OP EST MAY X REQ PHY/QHP: CPT

## 2023-01-19 ASSESSMENT — SLEEP AND FATIGUE QUESTIONNAIRES
NECK CIRCUMFERENCE (INCHES): 16.25
HOW LIKELY ARE YOU TO NOD OFF OR FALL ASLEEP IN A CAR, WHILE STOPPED FOR A FEW MINUTES IN TRAFFIC: 0
HOW LIKELY ARE YOU TO NOD OFF OR FALL ASLEEP WHILE SITTING AND READING: 3
HOW LIKELY ARE YOU TO NOD OFF OR FALL ASLEEP WHILE WATCHING TV: 2
HOW LIKELY ARE YOU TO NOD OFF OR FALL ASLEEP WHEN YOU ARE A PASSENGER IN A CAR FOR AN HOUR WITHOUT A BREAK: 3
HOW LIKELY ARE YOU TO NOD OFF OR FALL ASLEEP WHILE SITTING INACTIVE IN A PUBLIC PLACE: 1
HOW LIKELY ARE YOU TO NOD OFF OR FALL ASLEEP WHILE SITTING AND TALKING TO SOMEONE: 0
ESS TOTAL SCORE: 14
HOW LIKELY ARE YOU TO NOD OFF OR FALL ASLEEP WHILE LYING DOWN TO REST IN THE AFTERNOON WHEN CIRCUMSTANCES PERMIT: 2
HOW LIKELY ARE YOU TO NOD OFF OR FALL ASLEEP WHILE SITTING QUIETLY AFTER LUNCH WITHOUT ALCOHOL: 3

## 2023-01-19 NOTE — CONSULTS
Gayle Zuñiga MD, Vielka Arnett MD, Sandra Sandhu MD, [facility]      [address], [address] [facility]   PH: [phone]  F: [phone]     Subjective:     Patient ID: Jack Kasper is a 40 y.o. female, referred to the sleep center for   Chief Complaint   Patient presents with    Sleep Apnea   .     Referring physician:  Maryam Pascal been referred for the GURDEEP    Symptoms:   [x]  Snoring                                                                    [x]  Dry Mouth  []  Choking                                                                   []  Morning Headaches  []  Gasping for Air                                                        []  Trouble Falling asleep  [x]  Tired during the daytime                                         []  Trouble Staying Asleep  [x]  Tired when you wake up                                         [x]  Weight Gain in Last 5 Years  [x]  Wake up frequently at night                                    []  Weight Loss in Last 5 Years  []  Shortness Of Breath                                               []  Shift Worker  []  Coughing                                                                [x]  Smoker (Previous or Current) 1/2 pk/day x10 yrs quit about 10 yrs  []  Chest Pain                                                              [x]  Anxiety  []  Trouble keeping your legs still at night                   []  Depression  []  Kicking your legs in your sleep                               []  Insomnia     [] Palpitation  []   Stop breathing      []  Other:     Significant Co-morbidities:  []  Congestive Heart Failure     []  COPD         []  Stroke (Past 30 Days)      []  Supplemental Oxygen Usage       []  Cognitive Impairment      []  Neuromuscular Problems  []  Epilepsy/Neurological Disorders           Social History     Socioeconomic History    Marital status: Single     Spouse name: Not on file    Number of children: Not on file    Years of education: Not on file    Highest education level: Not on file   Occupational History    Not on file   Tobacco Use    Smoking status: Former     Packs/day: 0.50     Years: 12.00     Pack years: 6.00     Types: Cigarettes     Quit date: 1/29/2012     Years since quitting: 10.9    Smokeless tobacco: Never   Vaping Use    Vaping Use: Never used   Substance and Sexual Activity    Alcohol use: Yes     Comment: occasionally    Drug use: Yes     Frequency: 7.0 times per week     Types: Marijuana Jaquelin Ing)    Sexual activity: Yes     Partners: Female     Birth control/protection: Surgical   Other Topics Concern    Not on file   Social History Narrative    Not on file     Social Determinants of Health     Financial Resource Strain: Not on file   Food Insecurity: Not on file   Transportation Needs: Not on file   Physical Activity: Not on file   Stress: Not on file   Social Connections: Not on file   Intimate Partner Violence: Not on file   Housing Stability: Not on file       Prior to Admission medications    Medication Sig Start Date End Date Taking?  Authorizing Provider   ibuprofen (ADVIL;MOTRIN) 800 MG tablet Take 1 tablet by mouth 2 times daily as needed for Pain 1/5/22  Yes Mauri Matos MD   ALPRAZolam (XANAX) 1 MG tablet TAKE 1 TABLET BY MOUTH TWICE DAILY AS NEEDED FOR EXTREME ANXIETY AND PANIC ATTACKS 7/21/21  Yes Historical Provider, MD   methylPREDNISolone (MEDROL, SPENCER,) 4 MG tablet Take by mouth as directed on package  Patient not taking: Reported on 1/19/2023 10/21/21   Mary Friedman PA-C       Allergies as of 01/19/2023 - Fully Reviewed 01/19/2023   Allergen Reaction Noted    Zofran [ondansetron hcl] Hives 06/23/2016    Zoloft [sertraline hcl]  11/09/2016       Patient Active Problem List   Diagnosis    Umbilical hernia with obstruction but no gangrene    Periumbilical abdominal pain    Rectal pain    Left upper quadrant pain    History of asthma    Morbid obesity ([facility] [address]    Dyspnea on exertion    Abdominal pain    GURDEEP (obstructive sleep apnea)    Hypersomnia    Morbid obesity with BMI of 45.0-49.9, adult ([facility] [address]    Ex-smoker       Past Medical History:   Diagnosis Date    Abdominal cramps     \"feel like I am constantly getting punched  in my stomach- for 20 yrs\"    ADHD (attention deficit hyperactivity disorder)     Anxiety     Asthma     last flare up 7/2016    Depression     Dyspnea on exertion 12/22/2020    History of asthma 12/22/2020    HX OTHER MEDICAL 07/29/2016    \"irreg periods for 20 yrs- had period 1/2016 and 2/2016 and none since then\"    Insomnia     Morbid obesity ([facility] [address] 12/22/2020    Nausea     Preop pulmonary/respiratory exam 12/22/2020       Past Surgical History:   Procedure Laterality Date    CHOLECYSTECTOMY, LAPAROSCOPIC  02/25/14    robotic assisted lap tom    COLONOSCOPY  08/22/2016    internal hemorrhoids    ENDOSCOPY, COLON, DIAGNOSTIC  2015    HERNIA REPAIR  06-03-13    Hernia Repair x's 2    HYSTERECTOMY      UPPER GASTROINTESTINAL ENDOSCOPY  02/05/2021    UPPER GASTROINTESTINAL ENDOSCOPY N/A 2/5/2021    EGD BIOPSY performed by Stanley Pradhan MD at [place] [id number]  08/02/2016    laprascopic ventral hernia repair with mesh        Family History   Problem Relation Age of Onset    Heart Disease Maternal Grandfather          Objective:   /79   Pulse 74   Ht 5' 1\" (1.549 m)   Wt 247 lb (112 kg)   LMP 02/29/2016   SpO2 96%   BMI 46.67 kg/m²   Body mass index is 46.67 kg/m².   Sleep Medicine 1/19/2023 12/22/2020   Sitting and reading 3 0   Watching TV 2 0   Sitting, inactive in a public place (e.g. a theatre or a meeting) 1 0   As a passenger in a car for an hour without a break 3 0   Lying down to rest in the afternoon when circumstances permit 2 0   Sitting and talking to someone 0 0   Sitting quietly after a lunch without alcohol 3 0   In a car, while stopped for a few minutes in traffic 0 0   [place] Sleepiness Score 14 0   Neck circumference (Inches) 16.25 16     Mallampati 3    Vitals:    01/19/23 1114   BP: 128/79   Pulse: 74   SpO2: 96%   Weight: 247 lb (112 kg)   Height: 5' 1\" (1.549 m)     Neck circumference (Inches): 16.25  Inches  Danube - Danube Sleepiness Score: 14    Gen: No distress. Eyes: PERRL. No sclera icterus. No conjunctival injection. ENT: No discharge. Pharynx clear. External appearance of ears and nose normal.  Neck: Trachea midline. No obvious mass. Resp: No accessory muscle use. No crackles. No wheezes. No rhonchi. No dullness on percussion. CV: Regular rate. Regular rhythm. No murmur or rub. No edema. GI: Non-tender. Non-distended. No hernia. Skin: Warm, dry, normal texture and turgor. No nodule on exposed extremities. Lymph: No cervical LAD. No supraclavicular LAD. M/S: No cyanosis. No clubbing. No joint deformity. Psych: Oriented x 3. No anxiety. Awake. Alert. Intact judgement and insight.     Mallampati Airway Classification:   []1 []2 [x]3 []4        Assessment and Plan     Diagnosis:    Problem List          Respiratory    GURDEEP (obstructive sleep apnea)      She has symptoms of GURDEEP  Advised to go for the PSG  Loose weight         Relevant Orders    Baseline Diagnostic Sleep Study       Other    Hypersomnia      She has symptoms of GURDEEP  Advised to go for the PSG  Loose weight           Morbid obesity with BMI of 45.0-49.9, adult ([facility] [address]      Advised to loose weight with diet and exercise           Ex-smoker      Advised to c/w quitting smoking                  Additional Plan:     [x]  Sleep hygiene/ relaxation methods & CBTi principles review with patient   [x]  Avoid supine/back sleep until sleep study   [x]  Driving precautions   [x]  Medical consequences of untreated GURDEEP   [x]  Weight loss recommendations   [x]  Diet recommendations   [x]  Exercise   [x]  Advised to quit smoking       []  PFT referral   []  Bariatric Program referral      Follow-Up:    No follow-ups on file.     Electronically signed by Rolando Brown MD on 1/19/2023 at 11:31 AM

## 2023-03-03 ENCOUNTER — HOSPITAL ENCOUNTER (OUTPATIENT)
Dept: SLEEP CENTER | Age: 43
Discharge: HOME | End: 2023-03-03
Payer: MEDICAID

## 2023-03-03 DIAGNOSIS — G47.33 OSA (OBSTRUCTIVE SLEEP APNEA): ICD-10-CM

## 2023-03-03 PROCEDURE — 95810 POLYSOM 6/> YRS 4/> PARAM: CPT

## 2023-03-23 ENCOUNTER — HOSPITAL ENCOUNTER (OUTPATIENT)
Dept: SLEEP CENTER | Age: 43
Discharge: HOME | End: 2023-03-23
Payer: MEDICAID

## 2023-03-23 DIAGNOSIS — E66.01 MORBID OBESITY WITH BMI OF 45.0-49.9, ADULT (HCC): ICD-10-CM

## 2023-03-23 DIAGNOSIS — G47.10 HYPERSOMNIA: ICD-10-CM

## 2023-03-23 DIAGNOSIS — Z87.891 EX-SMOKER: ICD-10-CM

## 2023-03-23 DIAGNOSIS — G47.33 OSA (OBSTRUCTIVE SLEEP APNEA): ICD-10-CM

## 2023-03-23 PROCEDURE — 9990000010 HC NO CHARGE VISIT

## 2023-03-23 PROCEDURE — 99214 OFFICE O/P EST MOD 30 MIN: CPT | Performed by: INTERNAL MEDICINE

## 2023-03-23 ASSESSMENT — ENCOUNTER SYMPTOMS
COUGH: 0
EYE DISCHARGE: 0
EYE ITCHING: 0
BACK PAIN: 0
ABDOMINAL DISTENTION: 0
SHORTNESS OF BREATH: 0
ABDOMINAL PAIN: 0

## 2023-03-23 NOTE — PROGRESS NOTES
car, while stopped for a few minutes in traffic 0 0   [place] Sleepiness Score 14 0   Neck circumference (Inches) 16.25 16     Mallampati 3    Physical Exam  Vitals reviewed. Constitutional:       Appearance: Normal appearance. HENT:      Head: Normocephalic and atraumatic. Nose: Nose normal.      Mouth/Throat:      Mouth: Mucous membranes are moist.   Eyes:      Extraocular Movements: Extraocular movements intact. Pupils: Pupils are equal, round, and reactive to light. Cardiovascular:      Rate and Rhythm: Normal rate and regular rhythm. Pulses: Normal pulses. Heart sounds: Normal heart sounds. Pulmonary:      Effort: Pulmonary effort is normal.      Breath sounds: Normal breath sounds. Abdominal:      General: Abdomen is flat. Palpations: Abdomen is soft. Musculoskeletal:         General: Normal range of motion. Cervical back: Normal range of motion and neck supple. Skin:     General: Skin is warm and dry. Neurological:      General: No focal deficit present. Mental Status: She is alert and oriented to person, place, and time. Psychiatric:         Mood and Affect: Mood normal.         Behavior: Behavior normal.       Radiology: None    Assessment and Plan     Problem List          Respiratory    GURDEEP (obstructive sleep apnea)      She has mild GURDEEP with EDS  Advised to go for the CPAP titration study  Loose weight         Relevant Orders    Sleep Study with PAP Titration       Other    Hypersomnia       She has mild GURDEEP with EDS  Advised to go for the CPAP titration study  Loose weight           Morbid obesity with BMI of 45.0-49.9, adult ([address]      Advised to loose weight with diet and exercise           Ex-smoker      Advised to c/w quitting smoking                 Follow-Up:    No follow-ups on file.      Progress notes sent to the referring Provider    Latoya Garcia MD MD  3/23/2023  11:44 AM

## 2023-03-28 ENCOUNTER — HOSPITAL ENCOUNTER (OUTPATIENT)
Dept: SLEEP CENTER | Age: 43
Discharge: HOME | End: 2023-03-28
Payer: MEDICAID

## 2023-03-28 DIAGNOSIS — G47.33 OSA (OBSTRUCTIVE SLEEP APNEA): ICD-10-CM

## 2023-03-28 PROCEDURE — 95811 POLYSOM 6/>YRS CPAP 4/> PARM: CPT

## 2023-03-29 VITALS — WEIGHT: 247 LBS | BODY MASS INDEX: 46.63 KG/M2 | HEIGHT: 61 IN

## 2023-03-29 PROCEDURE — 95811 POLYSOM 6/>YRS CPAP 4/> PARM: CPT | Performed by: INTERNAL MEDICINE

## 2023-03-29 NOTE — PROGRESS NOTES
3/29/2023  sleep study  for [facility]  1980 is complete. Results are pending physician review.     Electronically signed by Julia Blakely on 3/29/2023 at 2:28 AM

## 2023-06-28 ENCOUNTER — HOSPITAL ENCOUNTER (OUTPATIENT)
Dept: SLEEP CENTER | Age: 43
Discharge: HOME | End: 2023-06-28
Payer: MEDICAID

## 2023-06-28 DIAGNOSIS — Z87.891 EX-SMOKER: ICD-10-CM

## 2023-06-28 DIAGNOSIS — E66.01 MORBID OBESITY WITH BMI OF 45.0-49.9, ADULT (HCC): ICD-10-CM

## 2023-06-28 DIAGNOSIS — G47.10 HYPERSOMNIA: ICD-10-CM

## 2023-06-28 DIAGNOSIS — G47.33 OSA (OBSTRUCTIVE SLEEP APNEA): ICD-10-CM

## 2023-06-28 PROCEDURE — 9990000010 HC NO CHARGE VISIT

## 2023-06-28 PROCEDURE — 99214 OFFICE O/P EST MOD 30 MIN: CPT | Performed by: INTERNAL MEDICINE

## 2023-06-28 ASSESSMENT — ENCOUNTER SYMPTOMS
BACK PAIN: 0
COUGH: 0
ABDOMINAL DISTENTION: 0
ABDOMINAL PAIN: 0
EYE ITCHING: 0
SHORTNESS OF BREATH: 0
EYE DISCHARGE: 0

## 2023-06-28 ASSESSMENT — SLEEP AND FATIGUE QUESTIONNAIRES
HOW LIKELY ARE YOU TO NOD OFF OR FALL ASLEEP WHILE SITTING QUIETLY AFTER LUNCH WITHOUT ALCOHOL: 3
HOW LIKELY ARE YOU TO NOD OFF OR FALL ASLEEP WHILE WATCHING TV: 2
HOW LIKELY ARE YOU TO NOD OFF OR FALL ASLEEP WHILE SITTING AND TALKING TO SOMEONE: 0
HOW LIKELY ARE YOU TO NOD OFF OR FALL ASLEEP WHEN YOU ARE A PASSENGER IN A CAR FOR AN HOUR WITHOUT A BREAK: 3
ESS TOTAL SCORE: 17
HOW LIKELY ARE YOU TO NOD OFF OR FALL ASLEEP WHILE LYING DOWN TO REST IN THE AFTERNOON WHEN CIRCUMSTANCES PERMIT: 3
HOW LIKELY ARE YOU TO NOD OFF OR FALL ASLEEP WHILE SITTING INACTIVE IN A PUBLIC PLACE: 3
HOW LIKELY ARE YOU TO NOD OFF OR FALL ASLEEP IN A CAR, WHILE STOPPED FOR A FEW MINUTES IN TRAFFIC: 0
HOW LIKELY ARE YOU TO NOD OFF OR FALL ASLEEP WHILE SITTING AND READING: 3

## 2023-06-30 ENCOUNTER — TELEPHONE (OUTPATIENT)
Dept: PULMONOLOGY | Age: 43
End: 2023-06-30

## 2024-10-02 ENCOUNTER — TELEPHONE (OUTPATIENT)
Dept: BARIATRICS/WEIGHT MGMT | Age: 44
End: 2024-10-02

## 2024-11-07 ENCOUNTER — PREP FOR PROCEDURE (OUTPATIENT)
Age: 44
End: 2024-11-07

## 2024-11-07 ENCOUNTER — OFFICE VISIT (OUTPATIENT)
Dept: SURGERY | Age: 44
End: 2024-11-07

## 2024-11-07 VITALS
OXYGEN SATURATION: 99 % | SYSTOLIC BLOOD PRESSURE: 138 MMHG | HEART RATE: 81 BPM | DIASTOLIC BLOOD PRESSURE: 86 MMHG | BODY MASS INDEX: 45.69 KG/M2 | HEIGHT: 61 IN | WEIGHT: 242 LBS

## 2024-11-07 DIAGNOSIS — R10.33 PERIUMBILICAL ABDOMINAL PAIN: Primary | ICD-10-CM

## 2024-11-07 PROCEDURE — 99203 OFFICE O/P NEW LOW 30 MIN: CPT | Performed by: PHYSICIAN ASSISTANT

## 2024-11-07 ASSESSMENT — ENCOUNTER SYMPTOMS
PHOTOPHOBIA: 0
SORE THROAT: 0
BACK PAIN: 0
COLOR CHANGE: 0
EYE ITCHING: 0
RECTAL PAIN: 0
APNEA: 0
EYE REDNESS: 0
ABDOMINAL PAIN: 1
CONSTIPATION: 0
ANAL BLEEDING: 0
CHOKING: 0
NAUSEA: 1
STRIDOR: 0

## 2024-11-07 ASSESSMENT — PATIENT HEALTH QUESTIONNAIRE - PHQ9
SUM OF ALL RESPONSES TO PHQ QUESTIONS 1-9: 0
SUM OF ALL RESPONSES TO PHQ QUESTIONS 1-9: 0
SUM OF ALL RESPONSES TO PHQ9 QUESTIONS 1 & 2: 0
2. FEELING DOWN, DEPRESSED OR HOPELESS: NOT AT ALL
SUM OF ALL RESPONSES TO PHQ QUESTIONS 1-9: 0
SUM OF ALL RESPONSES TO PHQ QUESTIONS 1-9: 0
1. LITTLE INTEREST OR PLEASURE IN DOING THINGS: NOT AT ALL

## 2024-11-07 NOTE — PROGRESS NOTES
counseled on risks, benefits, and alternatives of treatment plan at length today. Patient states an understanding and willingness to proceed with plan.    No orders of the defined types were placed in this encounter.      No orders of the defined types were placed in this encounter.       Follow Up:  No follow-ups on file.      Laura Merino PA-C

## 2024-12-10 ENCOUNTER — TELEPHONE (OUTPATIENT)
Dept: SURGERY | Age: 44
End: 2024-12-10

## 2024-12-10 NOTE — TELEPHONE ENCOUNTER
Tried calling pt to r/s appt on 12/12/2024 because CT has not been completed yet and pt needs to get r/s after she gets the testing done. LVM. Pt also advised on VM, if CT was completed elsewhere, then we can try to call that facility to get results.

## (undated) DEVICE — FORCEPS BX L240CM JAW DIA2.8MM L CAP W/ NDL MIC MESH TOOTH

## (undated) DEVICE — Z DISCONTINUED (USE MFG CAT MVABO)  TUBING GAS SAMPLING STD 6.5 FT FEMALE CONN SMRT CAPNOLINE